# Patient Record
Sex: FEMALE | URBAN - METROPOLITAN AREA
[De-identification: names, ages, dates, MRNs, and addresses within clinical notes are randomized per-mention and may not be internally consistent; named-entity substitution may affect disease eponyms.]

---

## 2020-10-03 ENCOUNTER — INPATIENT (INPATIENT)
Facility: HOSPITAL | Age: 55
LOS: 13 days | Discharge: HOME | End: 2020-10-17
Attending: INTERNAL MEDICINE | Admitting: INTERNAL MEDICINE
Payer: MEDICAID

## 2020-10-03 VITALS
TEMPERATURE: 98 F | DIASTOLIC BLOOD PRESSURE: 87 MMHG | HEART RATE: 107 BPM | RESPIRATION RATE: 20 BRPM | OXYGEN SATURATION: 93 % | SYSTOLIC BLOOD PRESSURE: 131 MMHG

## 2020-10-03 DIAGNOSIS — Z02.9 ENCOUNTER FOR ADMINISTRATIVE EXAMINATIONS, UNSPECIFIED: ICD-10-CM

## 2020-10-03 DIAGNOSIS — U07.1 COVID-19: ICD-10-CM

## 2020-10-03 DIAGNOSIS — J96.01 ACUTE RESPIRATORY FAILURE WITH HYPOXIA: ICD-10-CM

## 2020-10-03 DIAGNOSIS — Z29.9 ENCOUNTER FOR PROPHYLACTIC MEASURES, UNSPECIFIED: ICD-10-CM

## 2020-10-03 LAB
ALBUMIN SERPL ELPH-MCNC: 4.1 G/DL — SIGNIFICANT CHANGE UP (ref 3.3–5)
ALP SERPL-CCNC: 97 U/L — SIGNIFICANT CHANGE UP (ref 40–120)
ALT FLD-CCNC: 31 U/L — SIGNIFICANT CHANGE UP (ref 4–33)
ANION GAP SERPL CALC-SCNC: 15 MMO/L — HIGH (ref 7–14)
APPEARANCE UR: CLEAR — SIGNIFICANT CHANGE UP
APTT BLD: 37.2 SEC — HIGH (ref 27–36.3)
AST SERPL-CCNC: 35 U/L — HIGH (ref 4–32)
B PERT DNA SPEC QL NAA+PROBE: SIGNIFICANT CHANGE UP
BACTERIA # UR AUTO: NEGATIVE — SIGNIFICANT CHANGE UP
BASE EXCESS BLDV CALC-SCNC: 1.5 MMOL/L — SIGNIFICANT CHANGE UP
BASOPHILS # BLD AUTO: 0.01 K/UL — SIGNIFICANT CHANGE UP (ref 0–0.2)
BASOPHILS NFR BLD AUTO: 0.1 % — SIGNIFICANT CHANGE UP (ref 0–2)
BILIRUB SERPL-MCNC: 0.4 MG/DL — SIGNIFICANT CHANGE UP (ref 0.2–1.2)
BILIRUB UR-MCNC: NEGATIVE — SIGNIFICANT CHANGE UP
BLOOD GAS VENOUS - CREATININE: 0.68 MG/DL — SIGNIFICANT CHANGE UP (ref 0.5–1.3)
BLOOD UR QL VISUAL: NEGATIVE — SIGNIFICANT CHANGE UP
BUN SERPL-MCNC: 8 MG/DL — SIGNIFICANT CHANGE UP (ref 7–23)
C PNEUM DNA SPEC QL NAA+PROBE: SIGNIFICANT CHANGE UP
CALCIUM SERPL-MCNC: 8.9 MG/DL — SIGNIFICANT CHANGE UP (ref 8.4–10.5)
CHLORIDE BLDV-SCNC: 102 MMOL/L — SIGNIFICANT CHANGE UP (ref 96–108)
CHLORIDE SERPL-SCNC: 98 MMOL/L — SIGNIFICANT CHANGE UP (ref 98–107)
CO2 SERPL-SCNC: 25 MMOL/L — SIGNIFICANT CHANGE UP (ref 22–31)
COLOR SPEC: YELLOW — SIGNIFICANT CHANGE UP
CREAT SERPL-MCNC: 0.58 MG/DL — SIGNIFICANT CHANGE UP (ref 0.5–1.3)
CRP SERPL-MCNC: 263.3 MG/L — HIGH
D DIMER BLD IA.RAPID-MCNC: < 150 NG/ML — SIGNIFICANT CHANGE UP
EOSINOPHIL # BLD AUTO: 0 K/UL — SIGNIFICANT CHANGE UP (ref 0–0.5)
EOSINOPHIL NFR BLD AUTO: 0 % — SIGNIFICANT CHANGE UP (ref 0–6)
FERRITIN SERPL-MCNC: 302.6 NG/ML — HIGH (ref 15–150)
FIBRINOGEN PPP-MCNC: 1128 MG/DL — HIGH (ref 290–520)
FLUAV H1 2009 PAND RNA SPEC QL NAA+PROBE: SIGNIFICANT CHANGE UP
FLUAV H1 RNA SPEC QL NAA+PROBE: SIGNIFICANT CHANGE UP
FLUAV H3 RNA SPEC QL NAA+PROBE: SIGNIFICANT CHANGE UP
FLUAV SUBTYP SPEC NAA+PROBE: SIGNIFICANT CHANGE UP
FLUBV RNA SPEC QL NAA+PROBE: SIGNIFICANT CHANGE UP
GAS PNL BLDV: 138 MMOL/L — SIGNIFICANT CHANGE UP (ref 136–146)
GLUCOSE BLDV-MCNC: 136 MG/DL — HIGH (ref 70–99)
GLUCOSE SERPL-MCNC: 140 MG/DL — HIGH (ref 70–99)
GLUCOSE UR-MCNC: NEGATIVE — SIGNIFICANT CHANGE UP
HADV DNA SPEC QL NAA+PROBE: SIGNIFICANT CHANGE UP
HCO3 BLDV-SCNC: 24 MMOL/L — SIGNIFICANT CHANGE UP (ref 20–27)
HCOV PNL SPEC NAA+PROBE: SIGNIFICANT CHANGE UP
HCT VFR BLD CALC: 40.1 % — SIGNIFICANT CHANGE UP (ref 34.5–45)
HCT VFR BLDV CALC: 42.4 % — SIGNIFICANT CHANGE UP (ref 34.5–45)
HGB BLD-MCNC: 13 G/DL — SIGNIFICANT CHANGE UP (ref 11.5–15.5)
HGB BLDV-MCNC: 13.8 G/DL — SIGNIFICANT CHANGE UP (ref 11.5–15.5)
HMPV RNA SPEC QL NAA+PROBE: SIGNIFICANT CHANGE UP
HPIV1 RNA SPEC QL NAA+PROBE: SIGNIFICANT CHANGE UP
HPIV2 RNA SPEC QL NAA+PROBE: SIGNIFICANT CHANGE UP
HPIV3 RNA SPEC QL NAA+PROBE: SIGNIFICANT CHANGE UP
HPIV4 RNA SPEC QL NAA+PROBE: SIGNIFICANT CHANGE UP
HYALINE CASTS # UR AUTO: SIGNIFICANT CHANGE UP
IMM GRANULOCYTES NFR BLD AUTO: 0.7 % — SIGNIFICANT CHANGE UP (ref 0–1.5)
INR BLD: 1.17 — HIGH (ref 0.88–1.16)
KETONES UR-MCNC: HIGH
LACTATE BLDV-MCNC: 1.7 MMOL/L — SIGNIFICANT CHANGE UP (ref 0.5–2)
LDH SERPL L TO P-CCNC: 247 U/L — HIGH (ref 135–225)
LEUKOCYTE ESTERASE UR-ACNC: NEGATIVE — SIGNIFICANT CHANGE UP
LYMPHOCYTES # BLD AUTO: 0.91 K/UL — LOW (ref 1–3.3)
LYMPHOCYTES # BLD AUTO: 6 % — LOW (ref 13–44)
MCHC RBC-ENTMCNC: 28.3 PG — SIGNIFICANT CHANGE UP (ref 27–34)
MCHC RBC-ENTMCNC: 32.4 % — SIGNIFICANT CHANGE UP (ref 32–36)
MCV RBC AUTO: 87.2 FL — SIGNIFICANT CHANGE UP (ref 80–100)
MONOCYTES # BLD AUTO: 0.62 K/UL — SIGNIFICANT CHANGE UP (ref 0–0.9)
MONOCYTES NFR BLD AUTO: 4.1 % — SIGNIFICANT CHANGE UP (ref 2–14)
NEUTROPHILS # BLD AUTO: 13.6 K/UL — HIGH (ref 1.8–7.4)
NEUTROPHILS NFR BLD AUTO: 89.1 % — HIGH (ref 43–77)
NITRITE UR-MCNC: NEGATIVE — SIGNIFICANT CHANGE UP
NRBC # FLD: 0 K/UL — SIGNIFICANT CHANGE UP (ref 0–0)
NT-PROBNP SERPL-SCNC: 42.96 PG/ML — SIGNIFICANT CHANGE UP
PCO2 BLDV: 46 MMHG — SIGNIFICANT CHANGE UP (ref 41–51)
PH BLDV: 7.38 PH — SIGNIFICANT CHANGE UP (ref 7.32–7.43)
PH UR: 6.5 — SIGNIFICANT CHANGE UP (ref 5–8)
PLATELET # BLD AUTO: 308 K/UL — SIGNIFICANT CHANGE UP (ref 150–400)
PMV BLD: 9 FL — SIGNIFICANT CHANGE UP (ref 7–13)
PO2 BLDV: 26 MMHG — LOW (ref 35–40)
POTASSIUM BLDV-SCNC: 3.5 MMOL/L — SIGNIFICANT CHANGE UP (ref 3.4–4.5)
POTASSIUM SERPL-MCNC: 3.6 MMOL/L — SIGNIFICANT CHANGE UP (ref 3.5–5.3)
POTASSIUM SERPL-SCNC: 3.6 MMOL/L — SIGNIFICANT CHANGE UP (ref 3.5–5.3)
PROCALCITONIN SERPL-MCNC: 0.75 NG/ML — HIGH (ref 0.02–0.1)
PROT SERPL-MCNC: 7.6 G/DL — SIGNIFICANT CHANGE UP (ref 6–8.3)
PROT UR-MCNC: 300 — HIGH
PROTHROM AB SERPL-ACNC: 13.4 SEC — SIGNIFICANT CHANGE UP (ref 10.6–13.6)
RAPID RVP RESULT: DETECTED
RBC # BLD: 4.6 M/UL — SIGNIFICANT CHANGE UP (ref 3.8–5.2)
RBC # FLD: 13.6 % — SIGNIFICANT CHANGE UP (ref 10.3–14.5)
RBC CASTS # UR COMP ASSIST: SIGNIFICANT CHANGE UP (ref 0–?)
RV+EV RNA SPEC QL NAA+PROBE: SIGNIFICANT CHANGE UP
SAO2 % BLDV: 42 % — LOW (ref 60–85)
SARS-COV-2 RNA SPEC QL NAA+PROBE: DETECTED
SODIUM SERPL-SCNC: 138 MMOL/L — SIGNIFICANT CHANGE UP (ref 135–145)
SP GR SPEC: 1.03 — SIGNIFICANT CHANGE UP (ref 1–1.04)
SQUAMOUS # UR AUTO: SIGNIFICANT CHANGE UP
TROPONIN T, HIGH SENSITIVITY: < 6 NG/L — SIGNIFICANT CHANGE UP (ref ?–14)
UROBILINOGEN FLD QL: NORMAL — SIGNIFICANT CHANGE UP
WBC # BLD: 15.25 K/UL — HIGH (ref 3.8–10.5)
WBC # FLD AUTO: 15.25 K/UL — HIGH (ref 3.8–10.5)
WBC UR QL: SIGNIFICANT CHANGE UP (ref 0–?)

## 2020-10-03 PROCEDURE — 93010 ELECTROCARDIOGRAM REPORT: CPT

## 2020-10-03 PROCEDURE — 99223 1ST HOSP IP/OBS HIGH 75: CPT | Mod: GC

## 2020-10-03 PROCEDURE — 71045 X-RAY EXAM CHEST 1 VIEW: CPT | Mod: 26

## 2020-10-03 PROCEDURE — 99291 CRITICAL CARE FIRST HOUR: CPT

## 2020-10-03 RX ORDER — REMDESIVIR 5 MG/ML
200 INJECTION INTRAVENOUS EVERY 24 HOURS
Refills: 0 | Status: COMPLETED | OUTPATIENT
Start: 2020-10-03 | End: 2020-10-03

## 2020-10-03 RX ORDER — SODIUM CHLORIDE 9 MG/ML
500 INJECTION INTRAMUSCULAR; INTRAVENOUS; SUBCUTANEOUS ONCE
Refills: 0 | Status: COMPLETED | OUTPATIENT
Start: 2020-10-03 | End: 2020-10-03

## 2020-10-03 RX ORDER — ACETAMINOPHEN 500 MG
650 TABLET ORAL ONCE
Refills: 0 | Status: COMPLETED | OUTPATIENT
Start: 2020-10-03 | End: 2020-10-03

## 2020-10-03 RX ORDER — DEXAMETHASONE 0.5 MG/5ML
8 ELIXIR ORAL ONCE
Refills: 0 | Status: COMPLETED | OUTPATIENT
Start: 2020-10-03 | End: 2020-10-03

## 2020-10-03 RX ORDER — DEXAMETHASONE 0.5 MG/5ML
8 ELIXIR ORAL ONCE
Refills: 0 | Status: DISCONTINUED | OUTPATIENT
Start: 2020-10-03 | End: 2020-10-03

## 2020-10-03 RX ORDER — REMDESIVIR 5 MG/ML
100 INJECTION INTRAVENOUS EVERY 24 HOURS
Refills: 0 | Status: COMPLETED | OUTPATIENT
Start: 2020-10-04 | End: 2020-10-07

## 2020-10-03 RX ORDER — REMDESIVIR 5 MG/ML
INJECTION INTRAVENOUS
Refills: 0 | Status: COMPLETED | OUTPATIENT
Start: 2020-10-03 | End: 2020-10-07

## 2020-10-03 RX ORDER — DEXAMETHASONE 0.5 MG/5ML
6 ELIXIR ORAL DAILY
Refills: 0 | Status: DISCONTINUED | OUTPATIENT
Start: 2020-10-04 | End: 2020-10-15

## 2020-10-03 RX ORDER — ACETAMINOPHEN 500 MG
1000 TABLET ORAL ONCE
Refills: 0 | Status: COMPLETED | OUTPATIENT
Start: 2020-10-03 | End: 2020-10-03

## 2020-10-03 RX ORDER — ENOXAPARIN SODIUM 100 MG/ML
40 INJECTION SUBCUTANEOUS DAILY
Refills: 0 | Status: DISCONTINUED | OUTPATIENT
Start: 2020-10-03 | End: 2020-10-17

## 2020-10-03 RX ORDER — ACETAMINOPHEN 500 MG
650 TABLET ORAL EVERY 6 HOURS
Refills: 0 | Status: DISCONTINUED | OUTPATIENT
Start: 2020-10-03 | End: 2020-10-11

## 2020-10-03 RX ADMIN — SODIUM CHLORIDE 500 MILLILITER(S): 9 INJECTION INTRAMUSCULAR; INTRAVENOUS; SUBCUTANEOUS at 15:37

## 2020-10-03 RX ADMIN — Medication 650 MILLIGRAM(S): at 15:37

## 2020-10-03 RX ADMIN — ENOXAPARIN SODIUM 40 MILLIGRAM(S): 100 INJECTION SUBCUTANEOUS at 22:44

## 2020-10-03 RX ADMIN — Medication 650 MILLIGRAM(S): at 16:37

## 2020-10-03 RX ADMIN — Medication 400 MILLIGRAM(S): at 22:45

## 2020-10-03 RX ADMIN — Medication 101.6 MILLIGRAM(S): at 15:37

## 2020-10-03 RX ADMIN — Medication 8 MILLIGRAM(S): at 16:37

## 2020-10-03 RX ADMIN — REMDESIVIR 500 MILLIGRAM(S): 5 INJECTION INTRAVENOUS at 23:30

## 2020-10-03 RX ADMIN — Medication 1000 MILLIGRAM(S): at 23:00

## 2020-10-03 NOTE — H&P ADULT - PROBLEM SELECTOR PLAN 4
1. Name of PCP:  2. PCP contacted on admission: [  ] Y   [  ] N  3. PCP contacted at Discharge: [  ] Y   [  ] N  4. Post-Discharge Appointment Date and Location:   5 Summary of Handoff given to PCP:

## 2020-10-03 NOTE — ED PROVIDER NOTE - CLINICAL SUMMARY MEDICAL DECISION MAKING FREE TEXT BOX
Kevin Wu MD. 55 F presenting for 5 days of cough sob pleurtiic cp diarrhea. lost sense of taste. no known sick contacts or possible covid19 exposure. tachypneic to 40s; on 6L NC with improvement of o2 sat and breathing; when off, pt becomes more sob and tachypneic; very very high suspicion for covid19. will tx as such. steroids, gentle ivf, plan to admit for hypoxia. currently, does nto require bipap

## 2020-10-03 NOTE — H&P ADULT - PROBLEM SELECTOR PLAN 1
- patient SARS-CoV-2 positive,   - On Bipap minimal setting 10/5 40%. MICU recommended considering wean off as possible. appreciate recs  - Remdesivir 200mg IVP X1, then 100mg qday x4 day  - decadron 6mg IVP qday likely 2/2 +Sars-CoV-2, currently more comfortable appearing on bipap  -ddimer negative, trop negative. Will continue remdesivir and decadron as below  -monitor cont. pulse ox. wean off bipap as tolerated.  -albuterol as needed likely 2/2 +Sars-CoV-2, currently more comfortable appearing on bipap  -ddimer negative, trop negative, although ekg with twi in inferior leads with no prior to compare to, possibly demand related? Will continue remdesivir and decadron as below  -monitor cont. pulse ox. wean off bipap as tolerated.  -albuterol as needed

## 2020-10-03 NOTE — ED PROVIDER NOTE - NS ED ROS FT
Constitutional: fevers; no chills  HEENT: no visual changes, no sore throat, no rhinorrhea  CV: no cp; no palpitations  Resp: sob; cough  GI: no abd pain, no nausea, no vomiting, diarrhea, no constipation  : no dysuria, no hematuria  MSK: no myalgais; no arthralgias  skin: no rashes  neuro: no HA, no numbness; no weakness, no tingling  ROS statement: all other ROS negative except as per HPI

## 2020-10-03 NOTE — H&P ADULT - PROBLEM SELECTOR PLAN 3
1. Name of PCP:  2. PCP contacted on admission: [  ] Y   [  ] N  3. PCP contacted at Discharge: [  ] Y   [  ] N  4. Post-Discharge Appointment Date and Location:   5 Summary of Handoff given to PCP: - DVT: lovenox

## 2020-10-03 NOTE — ED ADULT NURSE REASSESSMENT NOTE - NS ED NURSE REASSESS COMMENT FT1
pt is alert sitting in stretcher on BIPAP, using phone, verbalizes she is feeling better after being put onto BIPAP. remains tachypneic to the 30s. will continue to monitor.

## 2020-10-03 NOTE — H&P ADULT - NSHPPHYSICALEXAM_GEN_ALL_CORE
VITALS:   Vital Signs Last 24 Hrs  T(C): 36.8 (03 Oct 2020 20:55), Max: 39.3 (03 Oct 2020 15:28)  T(F): 98.2 (03 Oct 2020 20:55), Max: 102.7 (03 Oct 2020 15:28)  HR: 85 (03 Oct 2020 20:55) (85 - 117)  BP: 144/78 (03 Oct 2020 20:55) (116/54 - 166/66)  BP(mean): 86 (03 Oct 2020 18:22) (74 - 86)  RR: 36 (03 Oct 2020 20:55) (20 - 36)  SpO2: 97% (03 Oct 2020 20:55) (93% - 97%)    GENERAL: NAD, lying in bed comfortably  HEAD:  Atraumatic, Normocephalic  EYES: EOMI, PERRLA, conjunctiva and sclera clear  ENT: Moist mucous membranes  NECK: Supple, No JVD  CHEST/LUNG: Clear to auscultation bilaterally; No rales, rhonchi, wheezing, or rubs. Unlabored respirations  HEART: Regular rate and rhythm; No murmurs, rubs, or gallops  ABDOMEN: Bowel sounds present; Soft, Nontender, Nondistended. No hepatomegally  EXTREMITIES:  2+ Peripheral Pulses, brisk capillary refill. No clubbing, cyanosis, or edema  NERVOUS SYSTEM:  Alert & Oriented X3, speech clear. No deficits   MSK: FROM all 4 extremities, full and equal strength  SKIN: No rashes or lesions VITALS:   Vital Signs Last 24 Hrs  T(C): 36.8 (03 Oct 2020 20:55), Max: 39.3 (03 Oct 2020 15:28)  T(F): 98.2 (03 Oct 2020 20:55), Max: 102.7 (03 Oct 2020 15:28)  HR: 85 (03 Oct 2020 20:55) (85 - 117)  BP: 144/78 (03 Oct 2020 20:55) (116/54 - 166/66)  BP(mean): 86 (03 Oct 2020 18:22) (74 - 86)  RR: 36 (03 Oct 2020 20:55) (20 - 36)  SpO2: 97% (03 Oct 2020 20:55) (93% - 97%)    GENERAL: NAD, lying in bed comfortably, +bipap  HEAD:  Atraumatic, Normocephalic  EYES: EOMI, PERRLA, conjunctiva and sclera clear  ENT: Moist mucous membranes  NECK: Supple, No JVD  CHEST/LUNG: Clear to auscultation bilaterally; No rales, rhonchi, wheezing, or rubs. Unlabored respirations  HEART: Regular rate and rhythm; No murmurs, rubs, or gallops  ABDOMEN: Bowel sounds present; Soft, Nontender, Nondistended. No hepatomegally  EXTREMITIES:  2+ Peripheral Pulses, brisk capillary refill. No clubbing, cyanosis, or edema  NERVOUS SYSTEM:  Alert & Oriented X3, speech clear. No deficits   MSK: FROM all 4 extremities, full and equal strength  SKIN: No rashes or lesions

## 2020-10-03 NOTE — CONSULT NOTE ADULT - ASSESSMENT
56 yo F with no significant PMH presented with SOB x 5 days, a/w loss of sense of smell and taste, nausea, and diarrhea, found to be hypoxic, initially placed on NC, switched to BIPAP for increased WOB. MICU consulted for new BIPAP.     #Acute hypoxic respiratory failure likely 2/2 COVID PNA  - hypoxic to 80s on RA -> 93% on 6LC -> 95% on BIPAP   - pt appears tachypneic but comfortable on BIPAP, is on minimal settings (10/5 40%)  - can c/w BIPAP for now, consider HFNC or weaning to NC   - f/u COVID PCR; given her symptoms and lab findings (elevated inflammatory markers), high suspicion of COVID     Patient is not a MICU candidate at this time. Please reconsult as needed.    - Lilia Mendiola PGY3

## 2020-10-03 NOTE — ED ADULT NURSE REASSESSMENT NOTE - NS ED NURSE REASSESS COMMENT FT1
As per respiratory, remove pt from bipap and place on NRB for transport to floor. Pt oxygen saturation 97% on NRB at this time, transport here to take pt to floor, respiratory here to transport bipap to floor.

## 2020-10-03 NOTE — CONSULT NOTE ADULT - SUBJECTIVE AND OBJECTIVE BOX
MICU Accept Note    CHIEF COMPLAINT: Patient is a 55y old  Female who presents with a chief complaint of SOB.    HPI / INTERVAL HISTORY:  54 yo F with no significant PMH presented with SOB x 5 days, a/w loss of sense of smell and taste, nausea, and diarrhea. Patient has had diarrhea for about 2 days with 3 episodes of watery diarrhea today. Patient endorses nonproductive cough as well. She states she has been outside. Denies sick contacts but her  recently came down with a fever yesterday. Patient states she had the COVID antibody test done recently and was negative. Per ED documentation, pt went to PCP yesterday and was prescribed a Z-pack. Per EMS she was hypoxic to 80s on RA, improved to 93% on 6L NC.     On arrival to ED, patient was still satting >90s on NC but noted to be tachypneic, with increased WOB. She was placed on BIPAP. MICU consulted for new BIPAP.       PAST MEDICAL & SURGICAL HISTORY:  No pertinent past medical history    No significant past surgical history        FAMILY HISTORY:  No pertinent family history in first degree relatives        SOCIAL HISTORY:  Denies smoking, drinking, no recreational drugs  Lives with       HOME MEDICATIONS:  No meds    Allergies    No Known Drug Allergies  Nuts (Hives)    Intolerances          REVIEW OF SYSTEMS:  Constitutional: +Fevers, fatigue; No chills, weight loss, weight gain  HEENT: No vision problems, eye pain, nasal congestion, rhinorrhea, sore throat, dysphagia  CV: +pleuritic chest pain; No orthopnea, palpitations  Resp: +nonproductive cough, dyspnea; no wheezing, hemoptysis  GI: +nausea, diarrhea; No vomiting, constipation, abdominal pain  : [ ] dysuria [ ] nocturia [ ] hematuria [ ] increased urinary frequency  Musculoskeletal: [ ] back pain [ ] myalgias [ ] arthralgias [ ] fracture  Skin: [ ] rash [ ] itch  Neurological: [ ] headache [ ] dizziness [ ] syncope [ ] weakness [ ] numbness  Psychiatric: [ ] anxiety [ ] depression  Endocrine: [ ] diabetes [ ] thyroid problem  Hematologic/Lymphatic: [ ] anemia [ ] bleeding problem  Allergic/Immunologic: [ ] itchy eyes [ ] nasal discharge [ ] hives [ ] angioedema  [ x] All other systems negative  [ ] Unable to assess ROS because ________    OBJECTIVE:  ICU Vital Signs Last 24 Hrs  T(C): 38.7 (03 Oct 2020 16:35), Max: 39.3 (03 Oct 2020 15:28)  T(F): 101.7 (03 Oct 2020 16:35), Max: 102.7 (03 Oct 2020 15:28)  HR: 94 (03 Oct 2020 18:22) (94 - 117)  BP: 121/72 (03 Oct 2020 18:22) (116/54 - 166/66)  BP(mean): 86 (03 Oct 2020 18:22) (74 - 86)  ABP: --  ABP(mean): --  RR: 32 (03 Oct 2020 18:22) (20 - 35)  SpO2: 97% (03 Oct 2020 18:22) (93% - 97%)        CAPILLARY BLOOD GLUCOSE          PHYSICAL EXAM:  GENERAL: No acute distress, on BIPAP  HEAD:  Atraumatic, Normocephalic  EYES: EOMI, conjunctiva and sclera clear  NECK: Supple, no lymphadenopathy  CHEST/LUNG: Tachypneic but not dyspneic, CTAB; No wheezes, rales, or rhonchi  HEART: Regular rate and rhythm; No murmurs, rubs, or gallops  ABDOMEN: Soft, non-tender, non-distended; normal bowel sounds, no organomegaly  EXTREMITIES:  2+ peripheral pulses b/l, No clubbing, cyanosis, or edema  NEUROLOGY: A&O x 3, no focal deficits  SKIN: No rashes or lesions    LINES:     HOSPITAL MEDICATIONS:  MEDICATIONS  (STANDING):    MEDICATIONS  (PRN):      LABS:                        13.0   15.25 )-----------( 308      ( 03 Oct 2020 15:30 )             40.1     Hgb Trend: 13.0<--  10-03    138  |  98  |  8   ----------------------------<  140<H>  3.6   |  25  |  0.58    Ca    8.9      03 Oct 2020 15:30    TPro  7.6  /  Alb  4.1  /  TBili  0.4  /  DBili  x   /  AST  35<H>  /  ALT  31  /  AlkPhos  97  10-03    Creatinine Trend: 0.58<--  PT/INR - ( 03 Oct 2020 15:30 )   PT: 13.4 SEC;   INR: 1.17          PTT - ( 03 Oct 2020 15:30 )  PTT:37.2 SEC      Venous Blood Gas:  10-03 @ 15:30  7.38/46/26/24/42.0  VBG Lactate: 1.7      MICROBIOLOGY:     RADIOLOGY & ADDITIONAL TESTS:  < from: Xray Chest 1 View- PORTABLE-Urgent (10.03.20 @ 16:05) >  INTERPRETATION:  Low lung volumes. bibasilar linear atelectasis.

## 2020-10-03 NOTE — ED ADULT NURSE REASSESSMENT NOTE - NS ED NURSE REASSESS COMMENT FT1
Break Coverage - Vitals as noted, on 6 L NC, tachycardic and tachypneic, MD Proctor at bedside for evaluation.  Patient on iso for r/o covid as per facility protocol.  IV placed to R AC #18g, labs collected/sent, medicated per e-mar. Advised to plan of care, comfort & safety measures in place, call bell in reach.

## 2020-10-03 NOTE — H&P ADULT - NSHPLABSRESULTS_GEN_ALL_CORE
13.0   15.25 )-----------( 308      ( 03 Oct 2020 15:30 )             40.1       10-03    138  |  98  |  8   ----------------------------<  140<H>  3.6   |  25  |  0.58    Ca    8.9      03 Oct 2020 15:30    TPro  7.6  /  Alb  4.1  /  TBili  0.4  /  DBili  x   /  AST  35<H>  /  ALT  31  /  AlkPhos  97  10-03          SARS-CoV-2: Detected: This Respiratory Panel uses polymerase chain reaction (PCR) to detect for   adenovirus; coronavirus (HKU1, NL63, 229E, OC43); human metapneumovirus   (hMPV); human enterovirus/rhinovirus (Entero/RV); influenza A; influenza   A/H1; influenza A/H3; influenza A/H1-2009; influenza B; parainfluenza   viruses 1, 2, 3, 4; respiratory syncytial virus; Mycoplasma pneumoniae;   Chlamydophila pneumoniae; and SARS-CoV-2. (10.03.20 @ 16:00) 13.0   15.25 )-----------( 308      ( 03 Oct 2020 15:30 )             40.1       10-03    138  |  98  |  8   ----------------------------<  140<H>  3.6   |  25  |  0.58    Ca    8.9      03 Oct 2020 15:30    TPro  7.6  /  Alb  4.1  /  TBili  0.4  /  DBili  x   /  AST  35<H>  /  ALT  31  /  AlkPhos  97  10-03          SARS-CoV-2: Detected: This Respiratory Panel uses polymerase chain reaction (PCR) to detect for   adenovirus; coronavirus (HKU1, NL63, 229E, OC43); human metapneumovirus   (hMPV); human enterovirus/rhinovirus (Entero/RV); influenza A; influenza   A/H1; influenza A/H3; influenza A/H1-2009; influenza B; parainfluenza   viruses 1, 2, 3, 4; respiratory syncytial virus; Mycoplasma pneumoniae;   Chlamydophila pneumoniae; and SARS-CoV-2. (10.03.20 @ 16:00)    EKG: sinus tachycardia with nonspecific twi 3, avf, V3-V4

## 2020-10-03 NOTE — H&P ADULT - NSHPREVIEWOFSYSTEMS_GEN_ALL_CORE
REVIEW OF SYSTEMS:    CONSTITUTIONAL: +fever fatigue  EYES/ENT: No visual changes;  No vertigo or throat pain   NECK: No pain or stiffness  RESPIRATORY: +cough; +shortness of breath  CARDIOVASCULAR: +Pleuritic chest pain, no palpitation  GASTROINTESTINAL: No abdominal or epigastric pain. +nausea, diarrhea.  GENITOURINARY: No dysuria, frequency or hematuria  NEUROLOGICAL: No numbness or weakness  SKIN: No itching, rashes

## 2020-10-03 NOTE — ED PROVIDER NOTE - PHYSICAL EXAMINATION
PHYSICAL EXAM:  GENERAL: tachypneic, breathing at rate of 30s-40s; unable to speak in full sentences; on 6L NC; when turning off O2, pt becomes more tachypneic;   HEAD Atraumatic, Normocephalic  NECK: No JVD; FROM  EYES: PERRL, EOMs intact b/l w/out deficits  CHEST/LUNG: CTAB no wheezes/rhonchi/rales  HEART: tachycardic; no murmur/gallops/rubs  ABDOMEN: +BS, soft, NT, ND  EXTREMITIES: No LE edema, +2 radial pulses b/l, +2 DP/PT pulses b/l  MUSCULOSKELETAL: FROM of all 4 extremities;  NERVOUS SYSTEM:  A&Ox3, No motor deficits or sensory deficits; CNII-XII intact; no focal neurologic deficits  SKIN:  No new rashes

## 2020-10-03 NOTE — CONSULT NOTE ADULT - ATTENDING COMMENTS
hypoxic resp failure and fever requiring bipap, high suspicion of COVID  pt comfortable on bipap 10/5 40% saturating 97%  pancx, Abx  r/o cobid/rvp  reconsult as needed

## 2020-10-03 NOTE — ED ADULT NURSE NOTE - NSIMPLEMENTINTERV_GEN_ALL_ED
Implemented All Universal Safety Interventions:  [place] to call system. Call bell, personal items and telephone within reach. Instruct patient to call for assistance. Room bathroom lighting operational. Non-slip footwear when patient is off stretcher. Physically safe environment: no spills, clutter or unnecessary equipment. Stretcher in lowest position, wheels locked, appropriate side rails in place.

## 2020-10-03 NOTE — ED ADULT NURSE NOTE - OBJECTIVE STATEMENT
Received pt to bed 5, A+Ox3, ambulatory. C/O SOB, MCKAY, Fever, chills, loss of taste/smell. Respirations even and labored, tachypneic to 35, no accessory muscle use, unable to speak in full clear uninterrupted sentences. ABD is soft, non tender, non distended with normal active bowel sounds, pt endorses 3 episodes of diarrhea. Pt denies any chest pain, headache, dizziness. will continue to monitor.

## 2020-10-03 NOTE — ED PROVIDER NOTE - OBJECTIVE STATEMENT
56 yo F with no significant PMHx presents to the ED for 5 days of cough, sob, pleurtiic cp, bodyaches, fevers. also states she has lost her sense of smell. also had 3 episodes of watery diarrhea today. went to pcp yeserday and was rx'd zpak but today, became so sob and thus presents to ED. per ems, pt hypoxic on RA to 80%; here improved to 93 on 6L NC. denies any sick contacts. pt is not working and states she has not been outside the house. if she does leave, she states she always wears her mask. of note, she states her  started feeling sick yesterday. 56 yo F with no significant PMHx presents to the ED for 5 days of cough, sob, pleurtiic cp, bodyaches, fevers. also states she has lost her sense of smell. also had 3 episodes of watery diarrhea today. went to pcp yeserday and was rx'd zpak but today, became so sob and thus presents to ED. per ems, pt hypoxic on RA to 80%; here improved to 93 on 6L NC. denies any sick contacts. pt is not working and states she has not been outside the house. if she does leave, she states she always wears her mask. of note, she states her  started feeling sick yesterday.  PMD DR. Schmidt

## 2020-10-03 NOTE — ED PROVIDER NOTE - PROGRESS NOTE DETAILS
Dr. Proctor: Pt remains tachypneic and will desat when O2 lowered. Pt placed on Bipap with improvement of symptoms. Kevin Wu MD. pt reassessed on bipap, doing well, subjectively feeling better, HR decreased, RR decreased. MICU evaluated; not a candidate at this time. accepted to hospitalist. nia: pt signed out by dr lozano. pt on bipap requiring MICU eval. pt hd stable, comfortable with no signs of obv resp distress. nia: pt rejected by micu as hd stable with no resp distress. pt stable for floor admission at this time.

## 2020-10-03 NOTE — H&P ADULT - PROBLEM SELECTOR PLAN 2
- DVT: lovenox - patient SARS-CoV-2 positive,   - On Bipap minimal setting 10/5 40%. MICU recommended considering wean off as possible. appreciate recs  - Remdesivir 200mg IVP X1, then 100mg qday x4 day  - decadron 6mg IVP qday

## 2020-10-03 NOTE — ED PROVIDER NOTE - CONSTITUTIONAL, MLM
normal... Ill appearing, awake, alert, oriented to person, place, time/situation and in moderate distress.

## 2020-10-03 NOTE — ED ADULT NURSE REASSESSMENT NOTE - NS ED NURSE REASSESS COMMENT FT1
Break Coverage - patient reports mild improvement to SOB, chest discomfort.  Appearing tachypneic, o2sat 96% on 5L NC, speech clear, able to talk in full sentences but with obvious increased WOB.  Patient advised possibility for bipap, explained, accepting of plan of care. Will CTM for improvement, call bell within reach.

## 2020-10-03 NOTE — ED ADULT TRIAGE NOTE - CHIEF COMPLAINT QUOTE
Pt c/o sob, cough fever and headache x 5 days.  O2 80 %RA, on 6 L at triage .  Pt started Zpac prescribed by PCP.  Pt took tylenol 2 tabs this am.  Denies chest pain, N/V, diarrhea

## 2020-10-03 NOTE — H&P ADULT - ASSESSMENT
Pt. was seen earlier, images reviewed with Dr. Bryson.  As per Dr. Bryson, do not agree with radiology, images of what to be stone does not relate to ureter.   F/U with Dr. Bryson as an outpatient. 54 yo F without PMH presented with COVID-19 pneumonia 54 yo F without PMH presents to ED with 5 days of fever, cough, shortness of breath found hypoxic in ED requiring bipap and with +COVID-19 to be admitted for further management.

## 2020-10-03 NOTE — H&P ADULT - NSHPSOCIALHISTORY_GEN_ALL_CORE
Patient live with her , who began to have cough yesterday, otherwise no sick contact. Patient live with her , who began to have cough yesterday, otherwise no known sick contact. Patient has been outside. Patient denies any smoking, alcohol use or recreational drug use.

## 2020-10-03 NOTE — ED PROVIDER NOTE - ATTENDING CONTRIBUTION TO CARE
Pt was seen and evaluated by me. Pt is a 56 y/o female with no significant PMHx who presented to the ED for cough, SOB, CP, body aches, and fevers X 5 days. Pt states over the past 5 days having cough, SOB, CP, body aches, and fevers. Pt admits to + sick contacts with  who has similar URI symptoms. Pt was started on ZIthromax with no relief. Pt called EMS today for worsening SOB and found to be 80% on RA. Pt was placed on NC at 6L and sating 95%. Pt admits to 2 episodes of diarrhea today yuliya with loss of smell. Denies any nausea, vomiting, or abd pain. Pt tachypneic. Lungs CTA b/l. Tachy. Abd soft, non-tender. No calf tenderness or swelling.  Concern for COVID/URI/Respiratory Distress  Labs, EKG, CXR, COVID, IVF, Steroids

## 2020-10-03 NOTE — H&P ADULT - HISTORY OF PRESENT ILLNESS
54 yo F with no significant PMH presented with SOB x 5 days, a/w loss of sense of smell and taste, nausea, and diarrhea. Patient has had diarrhea for about 2 days with 3 episodes of watery diarrhea today. Patient endorses nonproductive cough as well. She states she has been outside. Denies sick contacts but her  recently came down with a fever yesterday. Patient states she had the COVID antibody test done recently and was negative. Per ED documentation, pt went to PCP yesterday and was prescribed a Z-pack. Per EMS she was hypoxic to 80s on RA, improved to 93% on 6L NC.     On arrival to ED, patient was still satting >90s on NC but noted to be tachypneic, with increased WOB. She was placed on BIPAP.

## 2020-10-04 DIAGNOSIS — D72.829 ELEVATED WHITE BLOOD CELL COUNT, UNSPECIFIED: ICD-10-CM

## 2020-10-04 DIAGNOSIS — J96.01 ACUTE RESPIRATORY FAILURE WITH HYPOXIA: ICD-10-CM

## 2020-10-04 LAB
ALBUMIN SERPL ELPH-MCNC: 3.7 G/DL — SIGNIFICANT CHANGE UP (ref 3.3–5)
ALP SERPL-CCNC: 93 U/L — SIGNIFICANT CHANGE UP (ref 40–120)
ALT FLD-CCNC: 23 U/L — SIGNIFICANT CHANGE UP (ref 4–33)
ANION GAP SERPL CALC-SCNC: 12 MMO/L — SIGNIFICANT CHANGE UP (ref 7–14)
APTT BLD: 37.3 SEC — HIGH (ref 27–36.3)
AST SERPL-CCNC: 27 U/L — SIGNIFICANT CHANGE UP (ref 4–32)
BASOPHILS # BLD AUTO: 0.01 K/UL — SIGNIFICANT CHANGE UP (ref 0–0.2)
BASOPHILS NFR BLD AUTO: 0.1 % — SIGNIFICANT CHANGE UP (ref 0–2)
BILIRUB SERPL-MCNC: 0.3 MG/DL — SIGNIFICANT CHANGE UP (ref 0.2–1.2)
BUN SERPL-MCNC: 10 MG/DL — SIGNIFICANT CHANGE UP (ref 7–23)
CALCIUM SERPL-MCNC: 9 MG/DL — SIGNIFICANT CHANGE UP (ref 8.4–10.5)
CHLORIDE SERPL-SCNC: 104 MMOL/L — SIGNIFICANT CHANGE UP (ref 98–107)
CO2 SERPL-SCNC: 23 MMOL/L — SIGNIFICANT CHANGE UP (ref 22–31)
CREAT SERPL-MCNC: 0.41 MG/DL — LOW (ref 0.5–1.3)
CRP SERPL-MCNC: 320.5 MG/L — HIGH
EOSINOPHIL # BLD AUTO: 0 K/UL — SIGNIFICANT CHANGE UP (ref 0–0.5)
EOSINOPHIL NFR BLD AUTO: 0 % — SIGNIFICANT CHANGE UP (ref 0–6)
FERRITIN SERPL-MCNC: 328.3 NG/ML — HIGH (ref 15–150)
GLUCOSE SERPL-MCNC: 145 MG/DL — HIGH (ref 70–99)
HCT VFR BLD CALC: 38.1 % — SIGNIFICANT CHANGE UP (ref 34.5–45)
HCV AB S/CO SERPL IA: 0.07 S/CO — SIGNIFICANT CHANGE UP (ref 0–0.99)
HCV AB SERPL-IMP: SIGNIFICANT CHANGE UP
HGB BLD-MCNC: 12.5 G/DL — SIGNIFICANT CHANGE UP (ref 11.5–15.5)
IMM GRANULOCYTES NFR BLD AUTO: 1 % — SIGNIFICANT CHANGE UP (ref 0–1.5)
INR BLD: 1.13 — SIGNIFICANT CHANGE UP (ref 0.88–1.16)
LYMPHOCYTES # BLD AUTO: 0.92 K/UL — LOW (ref 1–3.3)
LYMPHOCYTES # BLD AUTO: 5.1 % — LOW (ref 13–44)
MAGNESIUM SERPL-MCNC: 2.3 MG/DL — SIGNIFICANT CHANGE UP (ref 1.6–2.6)
MCHC RBC-ENTMCNC: 28.5 PG — SIGNIFICANT CHANGE UP (ref 27–34)
MCHC RBC-ENTMCNC: 32.8 % — SIGNIFICANT CHANGE UP (ref 32–36)
MCV RBC AUTO: 86.8 FL — SIGNIFICANT CHANGE UP (ref 80–100)
MONOCYTES # BLD AUTO: 0.47 K/UL — SIGNIFICANT CHANGE UP (ref 0–0.9)
MONOCYTES NFR BLD AUTO: 2.6 % — SIGNIFICANT CHANGE UP (ref 2–14)
NEUTROPHILS # BLD AUTO: 16.56 K/UL — HIGH (ref 1.8–7.4)
NEUTROPHILS NFR BLD AUTO: 91.2 % — HIGH (ref 43–77)
NRBC # FLD: 0 K/UL — SIGNIFICANT CHANGE UP (ref 0–0)
PHOSPHATE SERPL-MCNC: 2 MG/DL — LOW (ref 2.5–4.5)
PLATELET # BLD AUTO: 316 K/UL — SIGNIFICANT CHANGE UP (ref 150–400)
PMV BLD: 8.8 FL — SIGNIFICANT CHANGE UP (ref 7–13)
POTASSIUM SERPL-MCNC: 3.8 MMOL/L — SIGNIFICANT CHANGE UP (ref 3.5–5.3)
POTASSIUM SERPL-SCNC: 3.8 MMOL/L — SIGNIFICANT CHANGE UP (ref 3.5–5.3)
PROCALCITONIN SERPL-MCNC: 0.68 NG/ML — HIGH (ref 0.02–0.1)
PROT SERPL-MCNC: 7.3 G/DL — SIGNIFICANT CHANGE UP (ref 6–8.3)
PROTHROM AB SERPL-ACNC: 12.9 SEC — SIGNIFICANT CHANGE UP (ref 10.6–13.6)
RBC # BLD: 4.39 M/UL — SIGNIFICANT CHANGE UP (ref 3.8–5.2)
RBC # FLD: 13.7 % — SIGNIFICANT CHANGE UP (ref 10.3–14.5)
SODIUM SERPL-SCNC: 139 MMOL/L — SIGNIFICANT CHANGE UP (ref 135–145)
WBC # BLD: 18.15 K/UL — HIGH (ref 3.8–10.5)
WBC # FLD AUTO: 18.15 K/UL — HIGH (ref 3.8–10.5)

## 2020-10-04 PROCEDURE — 99233 SBSQ HOSP IP/OBS HIGH 50: CPT

## 2020-10-04 RX ORDER — ALBUTEROL 90 UG/1
2 AEROSOL, METERED ORAL EVERY 4 HOURS
Refills: 0 | Status: DISCONTINUED | OUTPATIENT
Start: 2020-10-04 | End: 2020-10-04

## 2020-10-04 RX ORDER — SODIUM,POTASSIUM PHOSPHATES 278-250MG
1 POWDER IN PACKET (EA) ORAL
Refills: 0 | Status: COMPLETED | OUTPATIENT
Start: 2020-10-04 | End: 2020-10-04

## 2020-10-04 RX ORDER — ALBUTEROL 90 UG/1
1 AEROSOL, METERED ORAL EVERY 4 HOURS
Refills: 0 | Status: DISCONTINUED | OUTPATIENT
Start: 2020-10-04 | End: 2020-10-17

## 2020-10-04 RX ADMIN — ALBUTEROL 1 PUFF(S): 90 AEROSOL, METERED ORAL at 10:00

## 2020-10-04 RX ADMIN — ALBUTEROL 1 PUFF(S): 90 AEROSOL, METERED ORAL at 12:20

## 2020-10-04 RX ADMIN — REMDESIVIR 500 MILLIGRAM(S): 5 INJECTION INTRAVENOUS at 22:44

## 2020-10-04 RX ADMIN — ENOXAPARIN SODIUM 40 MILLIGRAM(S): 100 INJECTION SUBCUTANEOUS at 12:20

## 2020-10-04 RX ADMIN — Medication 1 PACKET(S): at 12:20

## 2020-10-04 RX ADMIN — Medication 1 PACKET(S): at 09:45

## 2020-10-04 RX ADMIN — Medication 1 PACKET(S): at 22:11

## 2020-10-04 RX ADMIN — ALBUTEROL 1 PUFF(S): 90 AEROSOL, METERED ORAL at 22:11

## 2020-10-04 RX ADMIN — ALBUTEROL 1 PUFF(S): 90 AEROSOL, METERED ORAL at 18:38

## 2020-10-04 RX ADMIN — Medication 1 PACKET(S): at 18:38

## 2020-10-04 NOTE — PROGRESS NOTE ADULT - PROBLEM SELECTOR PLAN 1
Likely 2/2 COVID, currently on BiPAP  -ddimer WNL, trop negative, although ekg with twi in inferior leads with no prior to compare to, possibly demand related?   -continue remdesivir and decadron  -monitor cont. pulse ox. wean off bipap as tolerated.  -albuterol as needed

## 2020-10-04 NOTE — PROGRESS NOTE ADULT - SUBJECTIVE AND OBJECTIVE BOX
Akosua Last  [facility]  Pager #[phone]    Patient is a 55y old  Female who presents with a chief complaint of SOB (04 Oct 2020 14:14)      SUBJECTIVE / OVERNIGHT EVENTS: Patient seen and examined at bedside. Currently on BiPAP with O2 saturation 96%, appears comfortable. Denies abdominal pain.    ADDITIONAL REVIEW OF SYSTEMS:    MEDICATIONS  (STANDING):  ALBUTerol    90 MICROgram(s) HFA Inhaler 1 Puff(s) Inhalation every 4 hours  dexAMETHasone  Injectable 6 milliGRAM(s) IV Push daily  enoxaparin Injectable 40 milliGRAM(s) SubCutaneous daily  potassium phosphate / sodium phosphate Powder (PHOS-NaK) 1 Packet(s) Oral four times a day with meals  remdesivir  IVPB 100 milliGRAM(s) IV Intermittent every 24 hours  remdesivir  IVPB   IV Intermittent     MEDICATIONS  (PRN):  acetaminophen   Tablet .. 650 milliGRAM(s) Oral every 6 hours PRN Temp greater or equal to 38C (100.4F)      CAPILLARY BLOOD GLUCOSE        I&O's Summary    03 Oct 2020 07:01  -  04 Oct 2020 07:00  --------------------------------------------------------  IN: 0 mL / OUT: 0 mL / NET: 0 mL    04 Oct 2020 07:01  -  04 Oct 2020 15:13  --------------------------------------------------------  IN: 450 mL / OUT: 0 mL / NET: 450 mL        PHYSICAL EXAM:    Vital Signs Last 24 Hrs  T(C): 36.7 (04 Oct 2020 14:00), Max: 39.3 (03 Oct 2020 15:28)  T(F): 98 (04 Oct 2020 14:00), Max: 102.7 (03 Oct 2020 15:28)  HR: 95 (04 Oct 2020 14:00) (67 - 109)  BP: 119/71 (04 Oct 2020 14:00) (112/72 - 166/66)  BP(mean): 86 (03 Oct 2020 18:22) (74 - 86)  RR: 20 (04 Oct 2020 14:00) (16 - 36)  SpO2: 98% (04 Oct 2020 14:00) (92% - 98%)    CONSTITUTIONAL: NAD, well-developed  EYES: Conjunctiva and sclera clear  RESPIRATORY: Crackles b/l  CARDIOVASCULAR: Regular rate and rhythm, normal S1 and S2, no murmur/rub/gallop; No lower extremity edema  ABDOMEN: Nontender to palpation, normoactive bowel sounds, no rebound/guarding  MUSCULOSKELETAL: No clubbing or cyanosis of digits  PSYCH: A+O to person, place, and time; affect appropriate  NEUROLOGY: CN 2-12 are intact and symmetric; no gross sensory deficits   SKIN: No rashes; no palpable lesions    LABS:                        12.5   18.15 )-----------( 316      ( 04 Oct 2020 06:50 )             38.1     10-04    139  |  104  |  10  ----------------------------<  145<H>  3.8   |  23  |  0.41<L>    Ca    9.0      04 Oct 2020 06:50  Phos  2.0     10-04  Mg     2.3     10-04    TPro  7.3  /  Alb  3.7  /  TBili  0.3  /  DBili  x   /  AST  27  /  ALT  23  /  AlkPhos  93  10-04    PT/INR - ( 04 Oct 2020 06:50 )   PT: 12.9 SEC;   INR: 1.13          PTT - ( 04 Oct 2020 06:50 )  PTT:37.3 SEC      Urinalysis Basic - ( 03 Oct 2020 21:56 )    Color: YELLOW / Appearance: CLEAR / S.033 / pH: 6.5  Gluc: NEGATIVE / Ketone: MODERATE  / Bili: NEGATIVE / Urobili: NORMAL   Blood: NEGATIVE / Protein: 300 / Nitrite: NEGATIVE   Leuk Esterase: NEGATIVE / RBC: 3-5 / WBC 3-5   Sq Epi: FEW / Non Sq Epi: x / Bacteria: NEGATIVE      RADIOLOGY & ADDITIONAL TESTS: No new imaging  Results Reviewed: Yes  Imaging Personally Reviewed:  Electrocardiogram Personally Reviewed:    COORDINATION OF CARE:  Care Discussed with Consultants/Other Providers [Y/N]:  Prior or Outpatient Records Reviewed [Y/N]:

## 2020-10-04 NOTE — PROGRESS NOTE ADULT - SUBJECTIVE AND OBJECTIVE BOX
Dr. Litzy Nelson  Pager [phone]    PROGRESS NOTE:     Patient is a 55y old  Female who presents with a chief complaint of SOB (03 Oct 2020 22:16)      SUBJECTIVE / OVERNIGHT EVENTS: pt is on cpap 10//fio2 45%, satting okay  ADDITIONAL REVIEW OF SYSTEMS: breathing a little better, has diarrhea     MEDICATIONS  (STANDING):  ALBUTerol    90 MICROgram(s) HFA Inhaler 1 Puff(s) Inhalation every 4 hours  dexAMETHasone  Injectable 6 milliGRAM(s) IV Push daily  enoxaparin Injectable 40 milliGRAM(s) SubCutaneous daily  potassium phosphate / sodium phosphate Powder (PHOS-NaK) 1 Packet(s) Oral four times a day with meals  remdesivir  IVPB 100 milliGRAM(s) IV Intermittent every 24 hours  remdesivir  IVPB   IV Intermittent     MEDICATIONS  (PRN):  acetaminophen   Tablet .. 650 milliGRAM(s) Oral every 6 hours PRN Temp greater or equal to 38C (100.4F)      CAPILLARY BLOOD GLUCOSE        I&O's Summary    03 Oct 2020 07:01  -  04 Oct 2020 07:00  --------------------------------------------------------  IN: 0 mL / OUT: 0 mL / NET: 0 mL    04 Oct 2020 07:01  -  04 Oct 2020 14:15  --------------------------------------------------------  IN: 200 mL / OUT: 0 mL / NET: 200 mL        PHYSICAL EXAM:  Vital Signs Last 24 Hrs  T(C): 36.7 (04 Oct 2020 09:30), Max: 39.3 (03 Oct 2020 15:28)  T(F): 98.1 (04 Oct 2020 09:30), Max: 102.7 (03 Oct 2020 15:28)  HR: 96 (04 Oct 2020 11:30) (67 - 117)  BP: 122/73 (04 Oct 2020 09:30) (112/72 - 166/66)  BP(mean): 86 (03 Oct 2020 18:22) (74 - 86)  RR: 20 (04 Oct 2020 09:30) (16 - 36)  SpO2: 92% (04 Oct 2020 11:30) (92% - 98%)  CONSTITUTIONAL: NAD, well-developed, on BIPAP  RESPIRATORY: Normal respiratory effort; lungs are clear to auscultation bilaterally  CARDIOVASCULAR: Regular rate and rhythm, normal S1 and S2, no murmur/rub/gallop; No lower extremity edema; Peripheral pulses are 2+ bilaterally  ABDOMEN: Nontender to palpation, normoactive bowel sounds, no rebound/guarding; No hepatosplenomegaly  MUSCULOSKELETAL: no clubbing or cyanosis of digits; no joint swelling or tenderness to palpation  PSYCH: A+O to person, place, and time; affect appropriate    LABS:                        12.5   18.15 )-----------( 316      ( 04 Oct 2020 06:50 )             38.1     10-04    139  |  104  |  10  ----------------------------<  145<H>  3.8   |  23  |  0.41<L>    Ca    9.0      04 Oct 2020 06:50  Phos  2.0     10-04  Mg     2.3     10-04    TPro  7.3  /  Alb  3.7  /  TBili  0.3  /  DBili  x   /  AST  27  /  ALT  23  /  AlkPhos  93  10-04    PT/INR - ( 04 Oct 2020 06:50 )   PT: 12.9 SEC;   INR: 1.13          PTT - ( 04 Oct 2020 06:50 )  PTT:37.3 SEC      Urinalysis Basic - ( 03 Oct 2020 21:56 )    Color: YELLOW / Appearance: CLEAR / S.033 / pH: 6.5  Gluc: NEGATIVE / Ketone: MODERATE  / Bili: NEGATIVE / Urobili: NORMAL   Blood: NEGATIVE / Protein: 300 / Nitrite: NEGATIVE   Leuk Esterase: NEGATIVE / RBC: 3-5 / WBC 3-5   Sq Epi: FEW / Non Sq Epi: x / Bacteria: NEGATIVE      RADIOLOGY & ADDITIONAL TESTS:  Results Reviewed:   Imaging Personally Reviewed:  < from: Xray Chest 1 View- PORTABLE-Urgent (10.03.20 @ 16:05) >  IMPRESSION:  Limited inspiration, right lower lung linear atelectasis, left lower lung atelectasis vs pneumonia.      Electrocardiogram Personally Reviewed:    COORDINATION OF CARE:  Care Discussed with Consultants/Other Providers [Y/N]:   Prior or Outpatient Records Reviewed [Y/N]:

## 2020-10-04 NOTE — PROGRESS NOTE ADULT - ASSESSMENT
56 yo F without PMH presents to ED with 5 days of fever, cough, shortness of breath found hypoxic in ED requiring bipap and with +COVID-19 to be admitted for further management.

## 2020-10-04 NOTE — PROGRESS NOTE ADULT - PROBLEM SELECTOR PLAN 2
- patient SARS-CoV-2 positive,   - On Bipap minimal setting 10/5 45%. MICU recommended considering wean off as possible. appreciate recs  - Remdesivir 200mg IVP X1, then 100mg qday x4 day  - decadron 6mg IVP qday, up to 10 days

## 2020-10-04 NOTE — PROGRESS NOTE ADULT - PROBLEM SELECTOR PLAN 2
COVID PCR positive on 10/3  - On BiPAP @ 10/5, 40% FiO2  - C/w Remdesivir and Decadron x5 days  - Monitor daily renal and hepatic function while on Remdesivir   - Check COVID antibodies  - Lovenox for DVT ppx- BMI 28  - Trend biomarkers

## 2020-10-04 NOTE — PROGRESS NOTE ADULT - PROBLEM SELECTOR PLAN 1
likely 2/2 +Sars-CoV-2, currently more comfortable appearing on bipap  -ddimer negative, trop negative, although ekg with twi in inferior leads with no prior to compare to, possibly demand related?   -Currently on BIPAP 10/5, FiO2 45%, wean as tolerated  -covid treatment in progress with remdesivir and decadron  -monitor cont. pulse ox.   -albuterol as needed

## 2020-10-04 NOTE — PROGRESS NOTE ADULT - ASSESSMENT
54 yo F without PMH presents to ED with 5 days of fever, cough, shortness of breath found hypoxic in ED requiring bipap and with +COVID-19 to be admitted for further management.

## 2020-10-05 LAB
ALBUMIN SERPL ELPH-MCNC: 3.3 G/DL — SIGNIFICANT CHANGE UP (ref 3.3–5)
ALP SERPL-CCNC: 92 U/L — SIGNIFICANT CHANGE UP (ref 40–120)
ALT FLD-CCNC: 20 U/L — SIGNIFICANT CHANGE UP (ref 4–33)
ANION GAP SERPL CALC-SCNC: 12 MMO/L — SIGNIFICANT CHANGE UP (ref 7–14)
AST SERPL-CCNC: 25 U/L — SIGNIFICANT CHANGE UP (ref 4–32)
BASOPHILS # BLD AUTO: 0.01 K/UL — SIGNIFICANT CHANGE UP (ref 0–0.2)
BASOPHILS NFR BLD AUTO: 0.1 % — SIGNIFICANT CHANGE UP (ref 0–2)
BILIRUB SERPL-MCNC: 0.3 MG/DL — SIGNIFICANT CHANGE UP (ref 0.2–1.2)
BUN SERPL-MCNC: 15 MG/DL — SIGNIFICANT CHANGE UP (ref 7–23)
CALCIUM SERPL-MCNC: 8.8 MG/DL — SIGNIFICANT CHANGE UP (ref 8.4–10.5)
CHLORIDE SERPL-SCNC: 101 MMOL/L — SIGNIFICANT CHANGE UP (ref 98–107)
CO2 SERPL-SCNC: 25 MMOL/L — SIGNIFICANT CHANGE UP (ref 22–31)
CREAT SERPL-MCNC: 0.47 MG/DL — LOW (ref 0.5–1.3)
EOSINOPHIL # BLD AUTO: 0 K/UL — SIGNIFICANT CHANGE UP (ref 0–0.5)
EOSINOPHIL NFR BLD AUTO: 0 % — SIGNIFICANT CHANGE UP (ref 0–6)
GLUCOSE SERPL-MCNC: 100 MG/DL — HIGH (ref 70–99)
HCT VFR BLD CALC: 37.1 % — SIGNIFICANT CHANGE UP (ref 34.5–45)
HGB BLD-MCNC: 11.9 G/DL — SIGNIFICANT CHANGE UP (ref 11.5–15.5)
IMM GRANULOCYTES NFR BLD AUTO: 0.5 % — SIGNIFICANT CHANGE UP (ref 0–1.5)
LYMPHOCYTES # BLD AUTO: 1.43 K/UL — SIGNIFICANT CHANGE UP (ref 1–3.3)
LYMPHOCYTES # BLD AUTO: 9.8 % — LOW (ref 13–44)
MAGNESIUM SERPL-MCNC: 2.3 MG/DL — SIGNIFICANT CHANGE UP (ref 1.6–2.6)
MCHC RBC-ENTMCNC: 28.2 PG — SIGNIFICANT CHANGE UP (ref 27–34)
MCHC RBC-ENTMCNC: 32.1 % — SIGNIFICANT CHANGE UP (ref 32–36)
MCV RBC AUTO: 87.9 FL — SIGNIFICANT CHANGE UP (ref 80–100)
MONOCYTES # BLD AUTO: 0.71 K/UL — SIGNIFICANT CHANGE UP (ref 0–0.9)
MONOCYTES NFR BLD AUTO: 4.8 % — SIGNIFICANT CHANGE UP (ref 2–14)
NEUTROPHILS # BLD AUTO: 12.43 K/UL — HIGH (ref 1.8–7.4)
NEUTROPHILS NFR BLD AUTO: 84.8 % — HIGH (ref 43–77)
NRBC # FLD: 0 K/UL — SIGNIFICANT CHANGE UP (ref 0–0)
PHOSPHATE SERPL-MCNC: 3.1 MG/DL — SIGNIFICANT CHANGE UP (ref 2.5–4.5)
PLATELET # BLD AUTO: 379 K/UL — SIGNIFICANT CHANGE UP (ref 150–400)
PMV BLD: 8.8 FL — SIGNIFICANT CHANGE UP (ref 7–13)
POTASSIUM SERPL-MCNC: 4.1 MMOL/L — SIGNIFICANT CHANGE UP (ref 3.5–5.3)
POTASSIUM SERPL-SCNC: 4.1 MMOL/L — SIGNIFICANT CHANGE UP (ref 3.5–5.3)
PROT SERPL-MCNC: 6.9 G/DL — SIGNIFICANT CHANGE UP (ref 6–8.3)
RBC # BLD: 4.22 M/UL — SIGNIFICANT CHANGE UP (ref 3.8–5.2)
RBC # FLD: 14 % — SIGNIFICANT CHANGE UP (ref 10.3–14.5)
SODIUM SERPL-SCNC: 138 MMOL/L — SIGNIFICANT CHANGE UP (ref 135–145)
WBC # BLD: 14.66 K/UL — HIGH (ref 3.8–10.5)
WBC # FLD AUTO: 14.66 K/UL — HIGH (ref 3.8–10.5)

## 2020-10-05 PROCEDURE — 99233 SBSQ HOSP IP/OBS HIGH 50: CPT

## 2020-10-05 RX ADMIN — ALBUTEROL 1 PUFF(S): 90 AEROSOL, METERED ORAL at 09:31

## 2020-10-05 RX ADMIN — ALBUTEROL 1 PUFF(S): 90 AEROSOL, METERED ORAL at 00:50

## 2020-10-05 RX ADMIN — Medication 6 MILLIGRAM(S): at 06:42

## 2020-10-05 RX ADMIN — ALBUTEROL 1 PUFF(S): 90 AEROSOL, METERED ORAL at 12:52

## 2020-10-05 RX ADMIN — ALBUTEROL 1 PUFF(S): 90 AEROSOL, METERED ORAL at 19:02

## 2020-10-05 RX ADMIN — REMDESIVIR 500 MILLIGRAM(S): 5 INJECTION INTRAVENOUS at 23:40

## 2020-10-05 RX ADMIN — ALBUTEROL 1 PUFF(S): 90 AEROSOL, METERED ORAL at 23:27

## 2020-10-05 RX ADMIN — ALBUTEROL 1 PUFF(S): 90 AEROSOL, METERED ORAL at 06:41

## 2020-10-05 RX ADMIN — ENOXAPARIN SODIUM 40 MILLIGRAM(S): 100 INJECTION SUBCUTANEOUS at 12:53

## 2020-10-05 NOTE — PROVIDER CONTACT NOTE (OTHER) - SITUATION
patient on bipap not tolerating O2 80/82 percent patient stating cant eat upset she is not on high flow can even speak her family with the bipap very upset

## 2020-10-05 NOTE — PROGRESS NOTE ADULT - SUBJECTIVE AND OBJECTIVE BOX
[facility] Division of Hospital Medicine  Tosha Pillai MD  Pager (M-F, 8A-5P): [phone]  Other Times:  [phone]    Patient is a 55y old  Female who presents with a chief complaint of SOB (04 Oct 2020 15:13)      SUBJECTIVE / OVERNIGHT EVENTS:  pt still requiring Bipap - she desats once off bipap.  she continues to feel sob.      MEDICATIONS  (STANDING):  ALBUTerol    90 MICROgram(s) HFA Inhaler 1 Puff(s) Inhalation every 4 hours  dexAMETHasone  Injectable 6 milliGRAM(s) IV Push daily  enoxaparin Injectable 40 milliGRAM(s) SubCutaneous daily  remdesivir  IVPB 100 milliGRAM(s) IV Intermittent every 24 hours  remdesivir  IVPB   IV Intermittent     MEDICATIONS  (PRN):  acetaminophen   Tablet .. 650 milliGRAM(s) Oral every 6 hours PRN Temp greater or equal to 38C (100.4F)      CAPILLARY BLOOD GLUCOSE        I&O's Summary    04 Oct 2020 07:01  -  05 Oct 2020 07:00  --------------------------------------------------------  IN: 1190 mL / OUT: 500 mL / NET: 690 mL        PHYSICAL EXAM:  Vital Signs Last 24 Hrs  T(C): 36.3 (05 Oct 2020 09:27), Max: 37.1 (04 Oct 2020 21:23)  T(F): 97.3 (05 Oct 2020 09:27), Max: 98.7 (04 Oct 2020 21:23)  HR: 81 (05 Oct 2020 11:02) (76 - 94)  BP: 110/69 (05 Oct 2020 09:27) (106/68 - 126/70)  BP(mean): --  RR: 23 (05 Oct 2020 13:33) (20 - 23)  SpO2: 92% (05 Oct 2020 13:33) (92% - 97%)    CONSTITUTIONAL: NAD, well-developed, well-groomed  EYES: EOMI; conjunctiva and sclera clear  ENMT: Moist oral mucosa; + Bipap   RESPIRATORY: Normal respiratory effort; lungs are clear to auscultation bilaterally  CARDIOVASCULAR: Regular rate and rhythm, normal S1 and S2, no murmur; No lower extremity edema  ABDOMEN: Nontender to palpation, normoactive bowel sounds, no rebound/guarding  MUSCULOSKELETAL:   no clubbing or cyanosis of digits; no joint swelling or tenderness to palpation  PSYCH: A+O to person, place, and time; affect appropriate  NEUROLOGY: CN 2-12 are intact and symmetric; no gross sensory deficits   SKIN: No rashes; no palpable lesions    LABS:                        11.9   14.66 )-----------( 379      ( 05 Oct 2020 06:10 )             37.1     10-05    138  |  101  |  15  ----------------------------<  100<H>  4.1   |  25  |  0.47<L>    Ca    8.8      05 Oct 2020 06:10  Phos  3.1     10-05  Mg     2.3     10-05    TPro  6.9  /  Alb  3.3  /  TBili  0.3  /  DBili  x   /  AST  25  /  ALT  20  /  AlkPhos  92  10-05    PT/INR - ( 04 Oct 2020 06:50 )   PT: 12.9 SEC;   INR: 1.13          PTT - ( 04 Oct 2020 06:50 )  PTT:37.3 SEC      Urinalysis Basic - ( 03 Oct 2020 21:56 )    Color: YELLOW / Appearance: CLEAR / S.033 / pH: 6.5  Gluc: NEGATIVE / Ketone: MODERATE  / Bili: NEGATIVE / Urobili: NORMAL   Blood: NEGATIVE / Protein: 300 / Nitrite: NEGATIVE   Leuk Esterase: NEGATIVE / RBC: 3-5 / WBC 3-5   Sq Epi: FEW / Non Sq Epi: x / Bacteria: NEGATIVE        Culture - Urine (collected 04 Oct 2020 03:50)  Source: .Urine Clean Catch (Midstream)  Preliminary Report (05 Oct 2020 11:19):    10,000 - 49,000 CFU/mL Gram Negative Rods    <10,000 CFU/ml Normal Urogenital karoline present    Culture - Blood (collected 03 Oct 2020 21:02)  Source: .Blood Blood-Peripheral  Preliminary Report (04 Oct 2020 22:01):    No growth to date.    Culture - Blood (collected 03 Oct 2020 21:02)  Source: .Blood Blood-Peripheral  Preliminary Report (04 Oct 2020 22:01):    No growth to date.      RADIOLOGY & ADDITIONAL TESTS:  Results Reviewed:   Imaging Personally Reviewed:  Electrocardiogram Personally Reviewed:    COORDINATION OF CARE:  Care Discussed with Consultants/Other Providers [Y/N]: Y  Prior or Outpatient Records Reviewed [Y/N]: Y

## 2020-10-05 NOTE — PROGRESS NOTE ADULT - PROBLEM SELECTOR PLAN 1
Likely 2/2 COVID, currently on BiPAP  -ddimer WNL, trop negative, although ekg with twi in inferior leads with no prior to compare to, possibly demand related - no chest pain   -continue remdesivir (10/3-7) and decadron (10/3-)  -monitor cont. pulse ox. wean off bipap as tolerated.  -albuterol as needed

## 2020-10-05 NOTE — PROGRESS NOTE ADULT - PROBLEM SELECTOR PLAN 2
COVID PCR positive on 10/3  - On BiPAP @ 10/5, 40% FiO2  -continue remdesivir (10/3-7) and decadron (10/3-)  - Monitor daily renal and hepatic function while on Remdesivir   - Check COVID antibodies  - Lovenox for DVT ppx- BMI 28  - Trend biomarkers

## 2020-10-06 LAB
-  AMIKACIN: SIGNIFICANT CHANGE UP
-  AMOXICILLIN/CLAVULANIC ACID: SIGNIFICANT CHANGE UP
-  AMPICILLIN/SULBACTAM: SIGNIFICANT CHANGE UP
-  AMPICILLIN: SIGNIFICANT CHANGE UP
-  AZTREONAM: SIGNIFICANT CHANGE UP
-  CEFAZOLIN: SIGNIFICANT CHANGE UP
-  CEFEPIME: SIGNIFICANT CHANGE UP
-  CEFOXITIN: SIGNIFICANT CHANGE UP
-  CEFTRIAXONE: SIGNIFICANT CHANGE UP
-  CIPROFLOXACIN: SIGNIFICANT CHANGE UP
-  ERTAPENEM: SIGNIFICANT CHANGE UP
-  GENTAMICIN: SIGNIFICANT CHANGE UP
-  IMIPENEM: SIGNIFICANT CHANGE UP
-  LEVOFLOXACIN: SIGNIFICANT CHANGE UP
-  MEROPENEM: SIGNIFICANT CHANGE UP
-  NITROFURANTOIN: SIGNIFICANT CHANGE UP
-  PIPERACILLIN/TAZOBACTAM: SIGNIFICANT CHANGE UP
-  TIGECYCLINE: SIGNIFICANT CHANGE UP
-  TOBRAMYCIN: SIGNIFICANT CHANGE UP
-  TRIMETHOPRIM/SULFAMETHOXAZOLE: SIGNIFICANT CHANGE UP
ALBUMIN SERPL ELPH-MCNC: 3.3 G/DL — SIGNIFICANT CHANGE UP (ref 3.3–5)
ALP SERPL-CCNC: 91 U/L — SIGNIFICANT CHANGE UP (ref 40–120)
ALT FLD-CCNC: 17 U/L — SIGNIFICANT CHANGE UP (ref 4–33)
ANION GAP SERPL CALC-SCNC: 11 MMO/L — SIGNIFICANT CHANGE UP (ref 7–14)
AST SERPL-CCNC: 18 U/L — SIGNIFICANT CHANGE UP (ref 4–32)
BASOPHILS # BLD AUTO: 0 K/UL — SIGNIFICANT CHANGE UP (ref 0–0.2)
BASOPHILS NFR BLD AUTO: 0 % — SIGNIFICANT CHANGE UP (ref 0–2)
BILIRUB SERPL-MCNC: 0.2 MG/DL — SIGNIFICANT CHANGE UP (ref 0.2–1.2)
BUN SERPL-MCNC: 18 MG/DL — SIGNIFICANT CHANGE UP (ref 7–23)
CALCIUM SERPL-MCNC: 8.9 MG/DL — SIGNIFICANT CHANGE UP (ref 8.4–10.5)
CHLORIDE SERPL-SCNC: 106 MMOL/L — SIGNIFICANT CHANGE UP (ref 98–107)
CO2 SERPL-SCNC: 24 MMOL/L — SIGNIFICANT CHANGE UP (ref 22–31)
CREAT SERPL-MCNC: 0.44 MG/DL — LOW (ref 0.5–1.3)
CULTURE RESULTS: SIGNIFICANT CHANGE UP
EOSINOPHIL # BLD AUTO: 0 K/UL — SIGNIFICANT CHANGE UP (ref 0–0.5)
EOSINOPHIL NFR BLD AUTO: 0 % — SIGNIFICANT CHANGE UP (ref 0–6)
GLUCOSE BLDC GLUCOMTR-MCNC: 126 MG/DL — HIGH (ref 70–99)
GLUCOSE BLDC GLUCOMTR-MCNC: 136 MG/DL — HIGH (ref 70–99)
GLUCOSE SERPL-MCNC: 118 MG/DL — HIGH (ref 70–99)
HCT VFR BLD CALC: 37.8 % — SIGNIFICANT CHANGE UP (ref 34.5–45)
HGB BLD-MCNC: 12.3 G/DL — SIGNIFICANT CHANGE UP (ref 11.5–15.5)
IMM GRANULOCYTES NFR BLD AUTO: 0.4 % — SIGNIFICANT CHANGE UP (ref 0–1.5)
LYMPHOCYTES # BLD AUTO: 0.78 K/UL — LOW (ref 1–3.3)
LYMPHOCYTES # BLD AUTO: 8.4 % — LOW (ref 13–44)
MAGNESIUM SERPL-MCNC: 2.2 MG/DL — SIGNIFICANT CHANGE UP (ref 1.6–2.6)
MCHC RBC-ENTMCNC: 28.7 PG — SIGNIFICANT CHANGE UP (ref 27–34)
MCHC RBC-ENTMCNC: 32.5 % — SIGNIFICANT CHANGE UP (ref 32–36)
MCV RBC AUTO: 88.1 FL — SIGNIFICANT CHANGE UP (ref 80–100)
METHOD TYPE: SIGNIFICANT CHANGE UP
MONOCYTES # BLD AUTO: 0.69 K/UL — SIGNIFICANT CHANGE UP (ref 0–0.9)
MONOCYTES NFR BLD AUTO: 7.4 % — SIGNIFICANT CHANGE UP (ref 2–14)
NEUTROPHILS # BLD AUTO: 7.8 K/UL — HIGH (ref 1.8–7.4)
NEUTROPHILS NFR BLD AUTO: 83.8 % — HIGH (ref 43–77)
NRBC # FLD: 0 K/UL — SIGNIFICANT CHANGE UP (ref 0–0)
ORGANISM # SPEC MICROSCOPIC CNT: SIGNIFICANT CHANGE UP
ORGANISM # SPEC MICROSCOPIC CNT: SIGNIFICANT CHANGE UP
PHOSPHATE SERPL-MCNC: 3.3 MG/DL — SIGNIFICANT CHANGE UP (ref 2.5–4.5)
PLATELET # BLD AUTO: 417 K/UL — HIGH (ref 150–400)
PMV BLD: 9 FL — SIGNIFICANT CHANGE UP (ref 7–13)
POTASSIUM SERPL-MCNC: 4.3 MMOL/L — SIGNIFICANT CHANGE UP (ref 3.5–5.3)
POTASSIUM SERPL-SCNC: 4.3 MMOL/L — SIGNIFICANT CHANGE UP (ref 3.5–5.3)
PROT SERPL-MCNC: 6.8 G/DL — SIGNIFICANT CHANGE UP (ref 6–8.3)
RBC # BLD: 4.29 M/UL — SIGNIFICANT CHANGE UP (ref 3.8–5.2)
RBC # FLD: 14.1 % — SIGNIFICANT CHANGE UP (ref 10.3–14.5)
SODIUM SERPL-SCNC: 141 MMOL/L — SIGNIFICANT CHANGE UP (ref 135–145)
SPECIMEN SOURCE: SIGNIFICANT CHANGE UP
WBC # BLD: 9.31 K/UL — SIGNIFICANT CHANGE UP (ref 3.8–10.5)
WBC # FLD AUTO: 9.31 K/UL — SIGNIFICANT CHANGE UP (ref 3.8–10.5)

## 2020-10-06 PROCEDURE — 99233 SBSQ HOSP IP/OBS HIGH 50: CPT

## 2020-10-06 RX ORDER — DEXTROSE 50 % IN WATER 50 %
12.5 SYRINGE (ML) INTRAVENOUS ONCE
Refills: 0 | Status: DISCONTINUED | OUTPATIENT
Start: 2020-10-06 | End: 2020-10-17

## 2020-10-06 RX ORDER — DEXTROSE 50 % IN WATER 50 %
25 SYRINGE (ML) INTRAVENOUS ONCE
Refills: 0 | Status: DISCONTINUED | OUTPATIENT
Start: 2020-10-06 | End: 2020-10-17

## 2020-10-06 RX ORDER — GLUCAGON INJECTION, SOLUTION 0.5 MG/.1ML
1 INJECTION, SOLUTION SUBCUTANEOUS ONCE
Refills: 0 | Status: DISCONTINUED | OUTPATIENT
Start: 2020-10-06 | End: 2020-10-17

## 2020-10-06 RX ORDER — SODIUM CHLORIDE 9 MG/ML
1000 INJECTION, SOLUTION INTRAVENOUS
Refills: 0 | Status: DISCONTINUED | OUTPATIENT
Start: 2020-10-06 | End: 2020-10-17

## 2020-10-06 RX ORDER — INSULIN LISPRO 100/ML
VIAL (ML) SUBCUTANEOUS
Refills: 0 | Status: DISCONTINUED | OUTPATIENT
Start: 2020-10-06 | End: 2020-10-17

## 2020-10-06 RX ORDER — DEXTROSE 50 % IN WATER 50 %
15 SYRINGE (ML) INTRAVENOUS ONCE
Refills: 0 | Status: DISCONTINUED | OUTPATIENT
Start: 2020-10-06 | End: 2020-10-17

## 2020-10-06 RX ORDER — INSULIN LISPRO 100/ML
VIAL (ML) SUBCUTANEOUS AT BEDTIME
Refills: 0 | Status: DISCONTINUED | OUTPATIENT
Start: 2020-10-06 | End: 2020-10-17

## 2020-10-06 RX ADMIN — REMDESIVIR 500 MILLIGRAM(S): 5 INJECTION INTRAVENOUS at 23:15

## 2020-10-06 RX ADMIN — Medication 6 MILLIGRAM(S): at 05:34

## 2020-10-06 RX ADMIN — ENOXAPARIN SODIUM 40 MILLIGRAM(S): 100 INJECTION SUBCUTANEOUS at 11:24

## 2020-10-06 RX ADMIN — ALBUTEROL 1 PUFF(S): 90 AEROSOL, METERED ORAL at 23:15

## 2020-10-06 RX ADMIN — ALBUTEROL 1 PUFF(S): 90 AEROSOL, METERED ORAL at 07:05

## 2020-10-06 RX ADMIN — ALBUTEROL 1 PUFF(S): 90 AEROSOL, METERED ORAL at 11:21

## 2020-10-06 RX ADMIN — ALBUTEROL 1 PUFF(S): 90 AEROSOL, METERED ORAL at 03:53

## 2020-10-06 RX ADMIN — ALBUTEROL 1 PUFF(S): 90 AEROSOL, METERED ORAL at 15:58

## 2020-10-06 RX ADMIN — ALBUTEROL 1 PUFF(S): 90 AEROSOL, METERED ORAL at 18:52

## 2020-10-06 NOTE — DISCHARGE NOTE PROVIDER - NSDCFUADDAPPT_GEN_ALL_CORE_FT
If you are in need of a general medicine physician and post-discharge medical follow-up for further care/recommendations you may contact the [facility] for an appointment [phone]

## 2020-10-06 NOTE — PROVIDER CONTACT NOTE (OTHER) - BACKGROUND
Patient admitted 10/3 Patient admitted 10/3 for shortness of breath, Patient admitted 10/3 for shortness of breath, fever, cough, positive COVID-19 diagnosis as of 10/3

## 2020-10-06 NOTE — DISCHARGE NOTE PROVIDER - NSDCCPCAREPLAN_GEN_ALL_CORE_FT
PRINCIPAL DISCHARGE DIAGNOSIS  Diagnosis: Acute hypoxemic respiratory failure  Assessment and Plan of Treatment: Likely secondary to COVID. Now resolved.      SECONDARY DISCHARGE DIAGNOSES  Diagnosis: COVID-19  Assessment and Plan of Treatment: You have been diagnosed with the COVID-19 virus during your hospital stay. You must self quarantine to complete a 14 day time period.  Monitor for fevers, shortness of breath and cough primarily.  Monitor your temperature daily to not any changes and increases.    It has been determined that you no longer need hospitalization and can recover while remaining in self-quarantine at home. You should follow the prevention steps below until a healthcare provider or local or state health department says you can return to your normal activities.  1. You should restrict activities outside your home, except for getting medical care.  2. Do not go to work, school, or public areas.  3. Avoid using public transportation, ride-sharing, or taxis.  4. Separate yourself from other people and animals in your home.  People: As much as possible, you should stay in a specific room and away from other people in your home. Also, you should use a separate bathroom, if available.  Animals: You should restrict contact with pets and other animals while you are sick with COVID-19, just like you would around other people. Although there have not been reports of pets or other animals becoming sick with COVID-19, it is still recommended that people sick with COVID-19 limit contact with animals until more information is known about the virus.  When possible, have another member of your household care for your animals while you are sick. If you must care for your pet or be around animals while you are sick, wash your hands before and after you interact with pets and wear a facemask.  5. Call ahead before visiting your doctor.  If you have a medical appointment, call the healthcare provider and tell them that you have or may have COVID-19. This will help the healthcare provider’s office take steps to keep other people from getting infected or exposed.  6. Wear a facemask.  You should wear a facemask when you are around other people (e.g., sharing a room or vehicle) or pets and before you enter a healthcare pro     PRINCIPAL DISCHARGE DIAGNOSIS  Diagnosis: Acute hypoxemic respiratory failure  Assessment and Plan of Treatment: Secondary to COVID. Now resolved.      SECONDARY DISCHARGE DIAGNOSES  Diagnosis: COVID-19  Assessment and Plan of Treatment: You have been diagnosed with the COVID-19 virus during your hospital stay. You must self quarantine to complete a 14 day time period.  Monitor for fevers, shortness of breath and cough primarily.  Monitor your temperature daily to not any changes and increases.    It has been determined that you no longer need hospitalization and can recover while remaining in self-quarantine at home   - You should restrict activities outside your home, except for getting medical care. Do not go to work, school, or public areas  - Avoid using public transportation, ride-sharing, or taxis  - Separate yourself from other people and animals in your home.  - Wear a facemask     PRINCIPAL DISCHARGE DIAGNOSIS  Diagnosis: Acute hypoxemic respiratory failure  Assessment and Plan of Treatment: Secondary to COVID. Now resolved.      SECONDARY DISCHARGE DIAGNOSES  Diagnosis: COVID-19  Assessment and Plan of Treatment: You have been diagnosed with the COVID-19 virus on 10/3 during your hospital stay. You must self quarantine to complete a 14 day time period.  Monitor for fevers, shortness of breath and cough primarily.  Monitor your temperature daily to not any changes and increases.    It has been determined that you no longer need hospitalization and can recover while remaining in self-quarantine at home   - You should restrict activities outside your home, except for getting medical care. Do not go to work, school, or public areas  - Avoid using public transportation, ride-sharing, or taxis  - Separate yourself from other people and animals in your home.  - Wear a facemask

## 2020-10-06 NOTE — PROGRESS NOTE ADULT - SUBJECTIVE AND OBJECTIVE BOX
[facility] Division of Hospital Medicine  Tosha Pillai MD  Pager (M-F, 8A-5P): [phone]  Other Times:  [phone]    Patient is a 55y old  Female who presents with a chief complaint of SOB (05 Oct 2020 14:39)      SUBJECTIVE / OVERNIGHT EVENTS:  pt on HFNC this am - seen eating, she looks comfortable but SO2 ranging 87-91.      MEDICATIONS  (STANDING):  ALBUTerol    90 MICROgram(s) HFA Inhaler 1 Puff(s) Inhalation every 4 hours  dexAMETHasone  Injectable 6 milliGRAM(s) IV Push daily  enoxaparin Injectable 40 milliGRAM(s) SubCutaneous daily  remdesivir  IVPB 100 milliGRAM(s) IV Intermittent every 24 hours  remdesivir  IVPB   IV Intermittent     MEDICATIONS  (PRN):  acetaminophen   Tablet .. 650 milliGRAM(s) Oral every 6 hours PRN Temp greater or equal to 38C (100.4F)      CAPILLARY BLOOD GLUCOSE        I&O's Summary    05 Oct 2020 07:01  -  06 Oct 2020 07:00  --------------------------------------------------------  IN: 1050 mL / OUT: 525 mL / NET: 525 mL        PHYSICAL EXAM:  Vital Signs Last 24 Hrs  T(C): 36.4 (06 Oct 2020 09:40), Max: 36.6 (05 Oct 2020 14:30)  T(F): 97.6 (06 Oct 2020 09:40), Max: 97.9 (05 Oct 2020 14:30)  HR: 70 (06 Oct 2020 09:40) (58 - 90)  BP: 120/64 (06 Oct 2020 09:40) (116/60 - 132/80)  BP(mean): --  RR: 20 (06 Oct 2020 10:29) (18 - 24)  SpO2: 92% (06 Oct 2020 10:29) (82% - 98%)    CONSTITUTIONAL: NAD, well-developed, well-groomed  EYES: EOMI; conjunctiva and sclera clear  ENMT: Moist oral mucosa; + Bipap   RESPIRATORY: Normal respiratory effort; lungs are clear to auscultation bilaterally  CARDIOVASCULAR: Regular rate and rhythm, normal S1 and S2, no murmur; No lower extremity edema  ABDOMEN: Nontender to palpation, normoactive bowel sounds, no rebound/guarding  MUSCULOSKELETAL:   no clubbing or cyanosis of digits; no joint swelling or tenderness to palpation  PSYCH: A+O to person, place, and time; affect appropriate  NEUROLOGY: CN 2-12 are intact and symmetric; no gross sensory deficits   SKIN: No rashes; no palpable lesions    LABS:                        12.3   9.31  )-----------( 417      ( 06 Oct 2020 06:50 )             37.8     10-06    141  |  106  |  18  ----------------------------<  118<H>  4.3   |  24  |  0.44<L>    Ca    8.9      06 Oct 2020 06:50  Phos  3.3     10-06  Mg     2.2     10-06    TPro  6.8  /  Alb  3.3  /  TBili  0.2  /  DBili  x   /  AST  18  /  ALT  17  /  AlkPhos  91  10-06      Culture - Urine (collected 04 Oct 2020 03:50)  Source: .Urine Clean Catch (Midstream)  Preliminary Report (05 Oct 2020 17:54):    10,000 - 49,000 CFU/mL Escherichia coli    <10,000 CFU/ml Normal Urogenital karoline present    Culture - Blood (collected 03 Oct 2020 21:02)  Source: .Blood Blood-Peripheral  Preliminary Report (04 Oct 2020 22:01):    No growth to date.    Culture - Blood (collected 03 Oct 2020 21:02)  Source: .Blood Blood-Peripheral  Preliminary Report (04 Oct 2020 22:01):    No growth to date.      RADIOLOGY & ADDITIONAL TESTS:  Results Reviewed:   Imaging Personally Reviewed:  Electrocardiogram Personally Reviewed:    COORDINATION OF CARE:  Care Discussed with Consultants/Other Providers [Y/N]: Y  Prior or Outpatient Records Reviewed [Y/N]: Y

## 2020-10-06 NOTE — DISCHARGE NOTE PROVIDER - NSDCCAREPROVSEEN_GEN_ALL_CORE_FT
[facility] Medicine ACP, Team  Tosha Pillai [facility] Medicine ACP, Team  Tosha Pillai [facility] Medicine ACP, Team    German Payne [facility] Medicine ACP, Team  German Payne

## 2020-10-06 NOTE — DISCHARGE NOTE PROVIDER - HOSPITAL COURSE
Patient is a 54 yo F with no significant PMH presented with SOB x 5 days, w/ loss of sense of smell and taste, nausea, fever, diarrhea, and nonproductive cough as well. Denies sick contacts but reported her  recently had a fever.     Patient stated she had the COVID antibody test done recently and was negative. On the day prior to admission she presented to her PCP and was prescribed a Z-pack. In route, EMS she was hypoxic to 80s on RA, improved to 93% on 6L NC.     Pt was admitted to the hospital and was found to have COVID 19 (10/03). Pt was treated with dex and remdesivir. Pt was also managed supportively with oxygen supplementation. Pt was weaned off supplemental oxygen and was saturating well on room air prior to discharge.     Dispo- On ___ this case was reviewed with  ____, the patient is medically stable and optimized for discharge. All medications were reviewed and prescriptions were sent to mutually agreed upon pharmacy.   Patient is a 56 yo F with no significant PMH presented with SOB x 5 days, w/ loss of sense of smell and taste, nausea, fever, diarrhea, and nonproductive cough as well. Denies sick contacts but reported her  recently had a fever.     Patient stated she had the COVID antibody test done recently and was negative. On the day prior to admission she presented to her PCP and was prescribed a Z-pack. In route, EMS she was hypoxic to 80s on RA, improved to 93% on 6L NC.     Pt was admitted to the hospital and was found to have COVID 19 (10/03). Pt was treated with dexamethasone and Remdesivir. Pt was also managed supportively with oxygen supplementation. Pt was weaned off supplemental oxygen and was saturating well on room air prior to discharge.     Dispo- On ___ this case was reviewed with Dr Payne, the patient is medically stable and optimized for discharge. All medications were reviewed and prescriptions were sent to mutually agreed upon pharmacy.   Patient is a 56 yo F with no significant PMH presented with SOB x 5 days, w/ loss of sense of smell and taste, nausea, fever, diarrhea, and nonproductive cough as well. Denies sick contacts but reported her  recently had a fever.     Patient stated she had the COVID antibody test done recently and was negative. On the day prior to admission she presented to her PCP and was prescribed a Z-pack. In route, EMS she was hypoxic to 80s on RA. MICU evaluated but she was not deemed a candidate for MICU transfer.     Pt was admitted to Medicine and was found to have COVID 19 (10/03). Pt was treated with dexamethasone and Remdesivir. Pt was also managed supportively with oxygen supplementation w/ HFNC. Pt was eventually weaned off supplemental oxygen and was saturating well on room air prior to discharge.     Dispo- On 10/17 this case was reviewed with Dr Payne, the patient is medically stable and optimized for discharge. All medications were reviewed and prescriptions were sent to mutually agreed upon pharmacy.

## 2020-10-06 NOTE — PROVIDER CONTACT NOTE (OTHER) - ACTION/TREATMENT ORDERED:
Per provider increase to Nasal cannula to 80, try proning patient and chest PT. Per provider increase to Nasal cannula to FiO2 80, try proning patient and chest PT. Per provider increase FiO2 80, currently set at 75, and try proning patient and chest PT. After speaking with provider, patient SPO2 back up to 91% on high flow nasal cannula without intervention.

## 2020-10-06 NOTE — PROGRESS NOTE ADULT - PROBLEM SELECTOR PLAN 2
COVID PCR positive on 10/3  - On BiPAP @ 10/5, 40% FiO2 but transitioning to high flow as tolerated  -continue remdesivir (10/3-7) and decadron (10/3-)  - Monitor daily renal and hepatic function while on Remdesivir   - Check COVID antibodies  - Lovenox for DVT ppx- BMI 28  - Trend biomarkers

## 2020-10-06 NOTE — DISCHARGE NOTE PROVIDER - NSDCMRMEDTOKEN_GEN_ALL_CORE_FT
acetaminophen 325 mg oral tablet: 2 tab(s) orally every 6 hours, As needed, Temp greater or equal to 38C (100.4F), Moderate Pain (4 - 6)  polyethylene glycol 3350 oral powder for reconstitution: 17 gram(s) orally once a day  senna oral tablet: 1 tab(s) orally once a day   acetaminophen 325 mg oral tablet: 2 tab(s) orally every 6 hours, As needed, Temp greater or equal to 38C (100.4F), Moderate Pain (4 - 6)  albuterol 90 mcg/inh inhalation aerosol: 1 puff(s) inhaled every 4 hoursas needed for shortness of breath  polyethylene glycol 3350 oral powder for reconstitution: 17 gram(s) orally once a day  senna oral tablet: 1 tab(s) orally once a day

## 2020-10-06 NOTE — PROGRESS NOTE ADULT - PROBLEM SELECTOR PLAN 1
Likely 2/2 COVID, currently on BiPAP  -continue remdesivir (10/3-7) and decadron (10/3-)  -monitor cont. pulse ox. wean off bipap as tolerated.  -albuterol as needed

## 2020-10-07 LAB
ALBUMIN SERPL ELPH-MCNC: 3.2 G/DL — LOW (ref 3.3–5)
ALP SERPL-CCNC: 85 U/L — SIGNIFICANT CHANGE UP (ref 40–120)
ALT FLD-CCNC: 21 U/L — SIGNIFICANT CHANGE UP (ref 4–33)
ANION GAP SERPL CALC-SCNC: 12 MMO/L — SIGNIFICANT CHANGE UP (ref 7–14)
AST SERPL-CCNC: 26 U/L — SIGNIFICANT CHANGE UP (ref 4–32)
BASOPHILS # BLD AUTO: 0.02 K/UL — SIGNIFICANT CHANGE UP (ref 0–0.2)
BASOPHILS NFR BLD AUTO: 0.2 % — SIGNIFICANT CHANGE UP (ref 0–2)
BILIRUB SERPL-MCNC: 0.2 MG/DL — SIGNIFICANT CHANGE UP (ref 0.2–1.2)
BUN SERPL-MCNC: 22 MG/DL — SIGNIFICANT CHANGE UP (ref 7–23)
CALCIUM SERPL-MCNC: 8.9 MG/DL — SIGNIFICANT CHANGE UP (ref 8.4–10.5)
CHLORIDE SERPL-SCNC: 104 MMOL/L — SIGNIFICANT CHANGE UP (ref 98–107)
CO2 SERPL-SCNC: 23 MMOL/L — SIGNIFICANT CHANGE UP (ref 22–31)
CREAT SERPL-MCNC: 0.48 MG/DL — LOW (ref 0.5–1.3)
CRP SERPL-MCNC: 27.9 MG/L — HIGH
D DIMER BLD IA.RAPID-MCNC: 154 NG/ML — SIGNIFICANT CHANGE UP
EOSINOPHIL # BLD AUTO: 0 K/UL — SIGNIFICANT CHANGE UP (ref 0–0.5)
EOSINOPHIL NFR BLD AUTO: 0 % — SIGNIFICANT CHANGE UP (ref 0–6)
FERRITIN SERPL-MCNC: 249.2 NG/ML — HIGH (ref 15–150)
GLUCOSE BLDC GLUCOMTR-MCNC: 120 MG/DL — HIGH (ref 70–99)
GLUCOSE BLDC GLUCOMTR-MCNC: 123 MG/DL — HIGH (ref 70–99)
GLUCOSE BLDC GLUCOMTR-MCNC: 124 MG/DL — HIGH (ref 70–99)
GLUCOSE BLDC GLUCOMTR-MCNC: 168 MG/DL — HIGH (ref 70–99)
GLUCOSE SERPL-MCNC: 114 MG/DL — HIGH (ref 70–99)
HBA1C BLD-MCNC: 6.3 % — HIGH (ref 4–5.6)
HCT VFR BLD CALC: 42.4 % — SIGNIFICANT CHANGE UP (ref 34.5–45)
HGB BLD-MCNC: 13.4 G/DL — SIGNIFICANT CHANGE UP (ref 11.5–15.5)
IMM GRANULOCYTES NFR BLD AUTO: 1.1 % — SIGNIFICANT CHANGE UP (ref 0–1.5)
LDH SERPL L TO P-CCNC: 248 U/L — HIGH (ref 135–225)
LYMPHOCYTES # BLD AUTO: 0.42 K/UL — LOW (ref 1–3.3)
LYMPHOCYTES # BLD AUTO: 3.4 % — LOW (ref 13–44)
MAGNESIUM SERPL-MCNC: 2.2 MG/DL — SIGNIFICANT CHANGE UP (ref 1.6–2.6)
MCHC RBC-ENTMCNC: 28.5 PG — SIGNIFICANT CHANGE UP (ref 27–34)
MCHC RBC-ENTMCNC: 31.6 % — LOW (ref 32–36)
MCV RBC AUTO: 90 FL — SIGNIFICANT CHANGE UP (ref 80–100)
MONOCYTES # BLD AUTO: 0.57 K/UL — SIGNIFICANT CHANGE UP (ref 0–0.9)
MONOCYTES NFR BLD AUTO: 4.7 % — SIGNIFICANT CHANGE UP (ref 2–14)
NEUTROPHILS # BLD AUTO: 11.09 K/UL — HIGH (ref 1.8–7.4)
NEUTROPHILS NFR BLD AUTO: 90.6 % — HIGH (ref 43–77)
NRBC # FLD: 0 K/UL — SIGNIFICANT CHANGE UP (ref 0–0)
PHOSPHATE SERPL-MCNC: 3.7 MG/DL — SIGNIFICANT CHANGE UP (ref 2.5–4.5)
PLATELET # BLD AUTO: 519 K/UL — HIGH (ref 150–400)
PMV BLD: 8.9 FL — SIGNIFICANT CHANGE UP (ref 7–13)
POTASSIUM SERPL-MCNC: 4.4 MMOL/L — SIGNIFICANT CHANGE UP (ref 3.5–5.3)
POTASSIUM SERPL-SCNC: 4.4 MMOL/L — SIGNIFICANT CHANGE UP (ref 3.5–5.3)
PROCALCITONIN SERPL-MCNC: 0.15 NG/ML — HIGH (ref 0.02–0.1)
PROT SERPL-MCNC: 6.7 G/DL — SIGNIFICANT CHANGE UP (ref 6–8.3)
RBC # BLD: 4.71 M/UL — SIGNIFICANT CHANGE UP (ref 3.8–5.2)
RBC # FLD: 13.4 % — SIGNIFICANT CHANGE UP (ref 10.3–14.5)
SODIUM SERPL-SCNC: 139 MMOL/L — SIGNIFICANT CHANGE UP (ref 135–145)
WBC # BLD: 12.23 K/UL — HIGH (ref 3.8–10.5)
WBC # FLD AUTO: 12.23 K/UL — HIGH (ref 3.8–10.5)

## 2020-10-07 PROCEDURE — 99233 SBSQ HOSP IP/OBS HIGH 50: CPT

## 2020-10-07 RX ADMIN — ENOXAPARIN SODIUM 40 MILLIGRAM(S): 100 INJECTION SUBCUTANEOUS at 12:22

## 2020-10-07 RX ADMIN — REMDESIVIR 500 MILLIGRAM(S): 5 INJECTION INTRAVENOUS at 23:45

## 2020-10-07 RX ADMIN — Medication 1: at 11:57

## 2020-10-07 RX ADMIN — Medication 6 MILLIGRAM(S): at 05:40

## 2020-10-07 RX ADMIN — ALBUTEROL 1 PUFF(S): 90 AEROSOL, METERED ORAL at 04:38

## 2020-10-07 RX ADMIN — ALBUTEROL 1 PUFF(S): 90 AEROSOL, METERED ORAL at 17:25

## 2020-10-07 RX ADMIN — ALBUTEROL 1 PUFF(S): 90 AEROSOL, METERED ORAL at 21:44

## 2020-10-07 RX ADMIN — ALBUTEROL 1 PUFF(S): 90 AEROSOL, METERED ORAL at 08:55

## 2020-10-07 RX ADMIN — ALBUTEROL 1 PUFF(S): 90 AEROSOL, METERED ORAL at 12:23

## 2020-10-07 NOTE — PROGRESS NOTE ADULT - PROBLEM SELECTOR PLAN 2
COVID PCR positive on 10/3  - On BiPAP @ 10/5, 40% FiO2 but transitioning to high flow as tolerated  -continue remdesivir (10/3-7) and decadron (10/3-)  - Monitor daily renal and hepatic function while on Remdesivir   - Check COVID antibodies  - Lovenox for DVT ppx- BMI 28  - Trend biomarkers COVID PCR positive on 10/3  - transitioning to high flow as tolerated  -continue remdesivir (10/3-7) and decadron (10/3-)  - Monitor daily renal and hepatic function while on Remdesivir   - Check COVID antibodies  - Lovenox for DVT ppx- BMI 28  - Trend biomarkers

## 2020-10-07 NOTE — PROGRESS NOTE ADULT - PROBLEM SELECTOR PLAN 1
Likely 2/2 COVID, currently on BiPAP  -continue remdesivir (10/3-7) and decadron (10/3-)  -monitor cont. pulse ox. wean off bipap as tolerated.  -albuterol as needed Likely 2/2 COVID, currently on HFNC   -continue remdesivir (10/3-7) and decadron (10/3-)  -monitor cont. pulse ox. wean off HFNC as tolerated  -albuterol as needed

## 2020-10-07 NOTE — PROGRESS NOTE ADULT - SUBJECTIVE AND OBJECTIVE BOX
[facility] Medicine  Tosha Pillai MD  Pager (M-F, 8A-5P): [phone]  Other Times:  [phone]    Patient is a 55y old  Female who presents with a chief complaint of SOB (06 Oct 2020 16:42)      SUBJECTIVE / OVERNIGHT EVENTS:      MEDICATIONS  (STANDING):  ALBUTerol    90 MICROgram(s) HFA Inhaler 1 Puff(s) Inhalation every 4 hours  dexAMETHasone  Injectable 6 milliGRAM(s) IV Push daily  dextrose 5%. 1000 milliLiter(s) (50 mL/Hr) IV Continuous <Continuous>  dextrose 50% Injectable 12.5 Gram(s) IV Push once  dextrose 50% Injectable 25 Gram(s) IV Push once  dextrose 50% Injectable 25 Gram(s) IV Push once  enoxaparin Injectable 40 milliGRAM(s) SubCutaneous daily  insulin lispro (HumaLOG) corrective regimen sliding scale   SubCutaneous three times a day before meals  insulin lispro (HumaLOG) corrective regimen sliding scale   SubCutaneous at bedtime  remdesivir  IVPB 100 milliGRAM(s) IV Intermittent every 24 hours  remdesivir  IVPB   IV Intermittent     MEDICATIONS  (PRN):  acetaminophen   Tablet .. 650 milliGRAM(s) Oral every 6 hours PRN Temp greater or equal to 38C (100.4F)  dextrose 40% Gel 15 Gram(s) Oral once PRN Blood Glucose LESS THAN 70 milliGRAM(s)/deciliter  glucagon  Injectable 1 milliGRAM(s) IntraMuscular once PRN Glucose LESS THAN 70 milligrams/deciliter      CAPILLARY BLOOD GLUCOSE      POCT Blood Glucose.: 123 mg/dL (07 Oct 2020 07:23)  POCT Blood Glucose.: 136 mg/dL (06 Oct 2020 23:01)  POCT Blood Glucose.: 126 mg/dL (06 Oct 2020 17:09)    I&O's Summary    06 Oct 2020 07:01  -  07 Oct 2020 07:00  --------------------------------------------------------  IN: 1040 mL / OUT: 0 mL / NET: 1040 mL        PHYSICAL EXAM:  Vital Signs Last 24 Hrs  T(C): 36.5 (07 Oct 2020 08:54), Max: 36.9 (06 Oct 2020 14:30)  T(F): 97.7 (07 Oct 2020 08:54), Max: 98.4 (06 Oct 2020 14:30)  HR: 69 (07 Oct 2020 08:54) (54 - 84)  BP: 110/59 (07 Oct 2020 08:54) (110/59 - 126/67)  BP(mean): --  RR: 18 (07 Oct 2020 08:54) (16 - 22)  SpO2: 96% (07 Oct 2020 08:54) (89% - 97%)    CONSTITUTIONAL: NAD, well-developed, well-groomed  EYES: EOMI; conjunctiva and sclera clear  ENMT: Moist oral mucosa  RESPIRATORY: Normal respiratory effort; lungs are clear to auscultation bilaterally  CARDIOVASCULAR: Regular rate and rhythm, normal S1 and S2, no murmur; No lower extremity edema  ABDOMEN: Nontender to palpation, normoactive bowel sounds, no rebound/guarding  MUSCULOSKELETAL:   no clubbing or cyanosis of digits; no joint swelling or tenderness to palpation  PSYCH: A+O to person, place, and time; affect appropriate  NEUROLOGY: CN 2-12 are intact and symmetric; no gross sensory deficits   SKIN: No rashes; no palpable lesions    LABS:                        12.3   9.31  )-----------( 417      ( 06 Oct 2020 06:50 )             37.8     10-07    139  |  104  |  22  ----------------------------<  114<H>  4.4   |  23  |  0.48<L>    Ca    8.9      07 Oct 2020 06:50  Phos  3.7     10-07  Mg     2.2     10-07    TPro  6.7  /  Alb  3.2<L>  /  TBili  0.2  /  DBili  x   /  AST  26  /  ALT  21  /  AlkPhos  85  10-07              RADIOLOGY & ADDITIONAL TESTS:  Results Reviewed:   Imaging Personally Reviewed:  Electrocardiogram Personally Reviewed:    COORDINATION OF CARE:  Care Discussed with Consultants/Other Providers [Y/N]: Y  Prior or Outpatient Records Reviewed [Y/N]: Y   [facility]  Tosha Pillai MD  Pager (M-F, 8A-5P): [phone]  Other Times:  [phone]    Patient is a 55y old  Female who presents with a chief complaint of SOB (06 Oct 2020 16:42)      SUBJECTIVE / OVERNIGHT EVENTS:  pt on HFNC - she is beginning to feel like breathing is a bit easier - she continues to desat when eating or talking    MEDICATIONS  (STANDING):  ALBUTerol    90 MICROgram(s) HFA Inhaler 1 Puff(s) Inhalation every 4 hours  dexAMETHasone  Injectable 6 milliGRAM(s) IV Push daily  dextrose 5%. 1000 milliLiter(s) (50 mL/Hr) IV Continuous <Continuous>  dextrose 50% Injectable 12.5 Gram(s) IV Push once  dextrose 50% Injectable 25 Gram(s) IV Push once  dextrose 50% Injectable 25 Gram(s) IV Push once  enoxaparin Injectable 40 milliGRAM(s) SubCutaneous daily  insulin lispro (HumaLOG) corrective regimen sliding scale   SubCutaneous three times a day before meals  insulin lispro (HumaLOG) corrective regimen sliding scale   SubCutaneous at bedtime  remdesivir  IVPB 100 milliGRAM(s) IV Intermittent every 24 hours  remdesivir  IVPB   IV Intermittent     MEDICATIONS  (PRN):  acetaminophen   Tablet .. 650 milliGRAM(s) Oral every 6 hours PRN Temp greater or equal to 38C (100.4F)  dextrose 40% Gel 15 Gram(s) Oral once PRN Blood Glucose LESS THAN 70 milliGRAM(s)/deciliter  glucagon  Injectable 1 milliGRAM(s) IntraMuscular once PRN Glucose LESS THAN 70 milligrams/deciliter      CAPILLARY BLOOD GLUCOSE      POCT Blood Glucose.: 123 mg/dL (07 Oct 2020 07:23)  POCT Blood Glucose.: 136 mg/dL (06 Oct 2020 23:01)  POCT Blood Glucose.: 126 mg/dL (06 Oct 2020 17:09)    I&O's Summary    06 Oct 2020 07:01  -  07 Oct 2020 07:00  --------------------------------------------------------  IN: 1040 mL / OUT: 0 mL / NET: 1040 mL        PHYSICAL EXAM:  Vital Signs Last 24 Hrs  T(C): 36.5 (07 Oct 2020 08:54), Max: 36.9 (06 Oct 2020 14:30)  T(F): 97.7 (07 Oct 2020 08:54), Max: 98.4 (06 Oct 2020 14:30)  HR: 69 (07 Oct 2020 08:54) (54 - 84)  BP: 110/59 (07 Oct 2020 08:54) (110/59 - 126/67)  BP(mean): --  RR: 18 (07 Oct 2020 08:54) (16 - 22)  SpO2: 96% (07 Oct 2020 08:54) (89% - 97%)    CONSTITUTIONAL: NAD, well-developed, well-groomed  EYES: EOMI; conjunctiva and sclera clear  ENMT: Moist oral mucosa; + HFNC  RESPIRATORY: Normal respiratory effort; lungs are clear to auscultation bilaterally  CARDIOVASCULAR: Regular rate and rhythm, normal S1 and S2, no murmur; No lower extremity edema  ABDOMEN: Nontender to palpation, normoactive bowel sounds, no rebound/guarding  MUSCULOSKELETAL:   no clubbing or cyanosis of digits; no joint swelling or tenderness to palpation  PSYCH: A+O to person, place, and time; affect appropriate  NEUROLOGY: CN 2-12 are intact and symmetric; no gross sensory deficits   SKIN: No rashes; no palpable lesions    LABS:                        12.3   9.31  )-----------( 417      ( 06 Oct 2020 06:50 )             37.8     10-07    139  |  104  |  22  ----------------------------<  114<H>  4.4   |  23  |  0.48<L>    Ca    8.9      07 Oct 2020 06:50  Phos  3.7     10-07  Mg     2.2     10-07    TPro  6.7  /  Alb  3.2<L>  /  TBili  0.2  /  DBili  x   /  AST  26  /  ALT  21  /  AlkPhos  85  10-07              RADIOLOGY & ADDITIONAL TESTS:  Results Reviewed:   Imaging Personally Reviewed:  Electrocardiogram Personally Reviewed:    COORDINATION OF CARE:  Care Discussed with Consultants/Other Providers [Y/N]: Y  Prior or Outpatient Records Reviewed [Y/N]: Y

## 2020-10-07 NOTE — PROGRESS NOTE ADULT - ASSESSMENT
54 yo F without PMH presents to ED with 5 days of fever, cough, shortness of breath found hypoxic in ED requiring bipap and with +COVID-19 to be admitted for further management. 56 yo F without PMH presents to ED with 5 days of fever, cough, shortness of breath found hypoxic in ED requiring bipap and with +COVID-19

## 2020-10-08 LAB
ALBUMIN SERPL ELPH-MCNC: 3.2 G/DL — LOW (ref 3.3–5)
ALBUMIN SERPL ELPH-MCNC: 3.3 G/DL — SIGNIFICANT CHANGE UP (ref 3.3–5)
ALP SERPL-CCNC: 84 U/L — SIGNIFICANT CHANGE UP (ref 40–120)
ALP SERPL-CCNC: 92 U/L — SIGNIFICANT CHANGE UP (ref 40–120)
ALT FLD-CCNC: 17 U/L — SIGNIFICANT CHANGE UP (ref 4–33)
ALT FLD-CCNC: 18 U/L — SIGNIFICANT CHANGE UP (ref 4–33)
ANION GAP SERPL CALC-SCNC: 10 MMO/L — SIGNIFICANT CHANGE UP (ref 7–14)
AST SERPL-CCNC: 14 U/L — SIGNIFICANT CHANGE UP (ref 4–32)
AST SERPL-CCNC: 16 U/L — SIGNIFICANT CHANGE UP (ref 4–32)
BASOPHILS # BLD AUTO: 0.01 K/UL — SIGNIFICANT CHANGE UP (ref 0–0.2)
BASOPHILS NFR BLD AUTO: 0.1 % — SIGNIFICANT CHANGE UP (ref 0–2)
BILIRUB DIRECT SERPL-MCNC: < 0.2 MG/DL — SIGNIFICANT CHANGE UP (ref 0.1–0.2)
BILIRUB SERPL-MCNC: 0.3 MG/DL — SIGNIFICANT CHANGE UP (ref 0.2–1.2)
BILIRUB SERPL-MCNC: 0.3 MG/DL — SIGNIFICANT CHANGE UP (ref 0.2–1.2)
BUN SERPL-MCNC: 18 MG/DL — SIGNIFICANT CHANGE UP (ref 7–23)
CALCIUM SERPL-MCNC: 8.4 MG/DL — SIGNIFICANT CHANGE UP (ref 8.4–10.5)
CHLORIDE SERPL-SCNC: 102 MMOL/L — SIGNIFICANT CHANGE UP (ref 98–107)
CO2 SERPL-SCNC: 24 MMOL/L — SIGNIFICANT CHANGE UP (ref 22–31)
CREAT SERPL-MCNC: 0.51 MG/DL — SIGNIFICANT CHANGE UP (ref 0.5–1.3)
CREAT SERPL-MCNC: 0.73 MG/DL — SIGNIFICANT CHANGE UP (ref 0.5–1.3)
CULTURE RESULTS: SIGNIFICANT CHANGE UP
CULTURE RESULTS: SIGNIFICANT CHANGE UP
EOSINOPHIL # BLD AUTO: 0 K/UL — SIGNIFICANT CHANGE UP (ref 0–0.5)
EOSINOPHIL NFR BLD AUTO: 0 % — SIGNIFICANT CHANGE UP (ref 0–6)
GLUCOSE BLDC GLUCOMTR-MCNC: 111 MG/DL — HIGH (ref 70–99)
GLUCOSE BLDC GLUCOMTR-MCNC: 128 MG/DL — HIGH (ref 70–99)
GLUCOSE BLDC GLUCOMTR-MCNC: 155 MG/DL — HIGH (ref 70–99)
GLUCOSE BLDC GLUCOMTR-MCNC: 240 MG/DL — HIGH (ref 70–99)
GLUCOSE SERPL-MCNC: 98 MG/DL — SIGNIFICANT CHANGE UP (ref 70–99)
HCT VFR BLD CALC: 40.2 % — SIGNIFICANT CHANGE UP (ref 34.5–45)
HGB BLD-MCNC: 13.1 G/DL — SIGNIFICANT CHANGE UP (ref 11.5–15.5)
IMM GRANULOCYTES NFR BLD AUTO: 1.5 % — SIGNIFICANT CHANGE UP (ref 0–1.5)
LYMPHOCYTES # BLD AUTO: 1.16 K/UL — SIGNIFICANT CHANGE UP (ref 1–3.3)
LYMPHOCYTES # BLD AUTO: 9.2 % — LOW (ref 13–44)
MAGNESIUM SERPL-MCNC: 2 MG/DL — SIGNIFICANT CHANGE UP (ref 1.6–2.6)
MCHC RBC-ENTMCNC: 28.6 PG — SIGNIFICANT CHANGE UP (ref 27–34)
MCHC RBC-ENTMCNC: 32.6 % — SIGNIFICANT CHANGE UP (ref 32–36)
MCV RBC AUTO: 87.8 FL — SIGNIFICANT CHANGE UP (ref 80–100)
MONOCYTES # BLD AUTO: 0.87 K/UL — SIGNIFICANT CHANGE UP (ref 0–0.9)
MONOCYTES NFR BLD AUTO: 6.9 % — SIGNIFICANT CHANGE UP (ref 2–14)
NEUTROPHILS # BLD AUTO: 10.33 K/UL — HIGH (ref 1.8–7.4)
NEUTROPHILS NFR BLD AUTO: 82.3 % — HIGH (ref 43–77)
NRBC # FLD: 0 K/UL — SIGNIFICANT CHANGE UP (ref 0–0)
PHOSPHATE SERPL-MCNC: 3.2 MG/DL — SIGNIFICANT CHANGE UP (ref 2.5–4.5)
PLATELET # BLD AUTO: 508 K/UL — HIGH (ref 150–400)
PMV BLD: 8.9 FL — SIGNIFICANT CHANGE UP (ref 7–13)
POTASSIUM SERPL-MCNC: 4.1 MMOL/L — SIGNIFICANT CHANGE UP (ref 3.5–5.3)
POTASSIUM SERPL-SCNC: 4.1 MMOL/L — SIGNIFICANT CHANGE UP (ref 3.5–5.3)
PROT SERPL-MCNC: 6.6 G/DL — SIGNIFICANT CHANGE UP (ref 6–8.3)
PROT SERPL-MCNC: 6.8 G/DL — SIGNIFICANT CHANGE UP (ref 6–8.3)
RBC # BLD: 4.58 M/UL — SIGNIFICANT CHANGE UP (ref 3.8–5.2)
RBC # FLD: 13.4 % — SIGNIFICANT CHANGE UP (ref 10.3–14.5)
SARS-COV-2 IGG SERPL QL IA: POSITIVE
SARS-COV-2 IGM SERPL IA-ACNC: 4.82 INDEX — HIGH
SODIUM SERPL-SCNC: 136 MMOL/L — SIGNIFICANT CHANGE UP (ref 135–145)
SPECIMEN SOURCE: SIGNIFICANT CHANGE UP
SPECIMEN SOURCE: SIGNIFICANT CHANGE UP
WBC # BLD: 12.56 K/UL — HIGH (ref 3.8–10.5)
WBC # FLD AUTO: 12.56 K/UL — HIGH (ref 3.8–10.5)

## 2020-10-08 PROCEDURE — 99233 SBSQ HOSP IP/OBS HIGH 50: CPT

## 2020-10-08 RX ORDER — REMDESIVIR 5 MG/ML
100 INJECTION INTRAVENOUS EVERY 24 HOURS
Refills: 0 | Status: COMPLETED | OUTPATIENT
Start: 2020-10-08 | End: 2020-10-12

## 2020-10-08 RX ADMIN — ALBUTEROL 1 PUFF(S): 90 AEROSOL, METERED ORAL at 12:30

## 2020-10-08 RX ADMIN — Medication 1: at 12:03

## 2020-10-08 RX ADMIN — ALBUTEROL 1 PUFF(S): 90 AEROSOL, METERED ORAL at 00:45

## 2020-10-08 RX ADMIN — ALBUTEROL 1 PUFF(S): 90 AEROSOL, METERED ORAL at 22:00

## 2020-10-08 RX ADMIN — ALBUTEROL 1 PUFF(S): 90 AEROSOL, METERED ORAL at 09:00

## 2020-10-08 RX ADMIN — REMDESIVIR 500 MILLIGRAM(S): 5 INJECTION INTRAVENOUS at 23:34

## 2020-10-08 RX ADMIN — ENOXAPARIN SODIUM 40 MILLIGRAM(S): 100 INJECTION SUBCUTANEOUS at 13:45

## 2020-10-08 RX ADMIN — Medication 6 MILLIGRAM(S): at 05:30

## 2020-10-08 RX ADMIN — ALBUTEROL 1 PUFF(S): 90 AEROSOL, METERED ORAL at 18:00

## 2020-10-08 RX ADMIN — ALBUTEROL 1 PUFF(S): 90 AEROSOL, METERED ORAL at 05:30

## 2020-10-08 NOTE — PROGRESS NOTE ADULT - PROBLEM SELECTOR PROBLEM 2
2019 novel coronavirus disease (COVID-19)

## 2020-10-08 NOTE — PROGRESS NOTE ADULT - PROBLEM SELECTOR PLAN 3
- DVT: lovenox
- leuokocytosis due to steroids  - Procalcitonin mildly elevated, low suspicion for superimposed bacterial PNA  - Continue to trend/monitor for fevers
WBC 18 today, likely elevated due to steroid use  - Procalcitonin mildly elevated, low suspicion for superimposed bacterial PNA  - Continue to trend/monitor for fevers
WBC 18 today, likely elevated due to steroid use  - Procalcitonin mildly elevated, low suspicion for superimposed bacterial PNA  - Continue to trend/monitor for fevers

## 2020-10-08 NOTE — PROGRESS NOTE ADULT - PROBLEM SELECTOR PROBLEM 4
Discharge planning issues
Prophylactic measure

## 2020-10-08 NOTE — PROGRESS NOTE ADULT - ASSESSMENT
56 yo F without PMH presents to ED with 5 days of fever, cough, shortness of breath found hypoxic in ED requiring bipap and with +COVID-19

## 2020-10-08 NOTE — PROGRESS NOTE ADULT - SUBJECTIVE AND OBJECTIVE BOX
[facility]  Tosha Pillai MD  Pager (M-F, 8A-5P): [phone]  Other Times:  [phone]    Patient is a 55y old  Female who presents with a chief complaint of SOB (07 Oct 2020 09:58)    SUBJECTIVE / OVERNIGHT EVENTS:  Pt on HFNC - desats slightly when eating otherwise feeling well     MEDICATIONS  (STANDING):  ALBUTerol    90 MICROgram(s) HFA Inhaler 1 Puff(s) Inhalation every 4 hours  dexAMETHasone  Injectable 6 milliGRAM(s) IV Push daily  dextrose 5%. 1000 milliLiter(s) (50 mL/Hr) IV Continuous <Continuous>  dextrose 50% Injectable 12.5 Gram(s) IV Push once  dextrose 50% Injectable 25 Gram(s) IV Push once  dextrose 50% Injectable 25 Gram(s) IV Push once  enoxaparin Injectable 40 milliGRAM(s) SubCutaneous daily  insulin lispro (HumaLOG) corrective regimen sliding scale   SubCutaneous three times a day before meals  insulin lispro (HumaLOG) corrective regimen sliding scale   SubCutaneous at bedtime  remdesivir  IVPB 100 milliGRAM(s) IV Intermittent every 24 hours    MEDICATIONS  (PRN):  acetaminophen   Tablet .. 650 milliGRAM(s) Oral every 6 hours PRN Temp greater or equal to 38C (100.4F)  dextrose 40% Gel 15 Gram(s) Oral once PRN Blood Glucose LESS THAN 70 milliGRAM(s)/deciliter  glucagon  Injectable 1 milliGRAM(s) IntraMuscular once PRN Glucose LESS THAN 70 milligrams/deciliter      CAPILLARY BLOOD GLUCOSE      POCT Blood Glucose.: 155 mg/dL (08 Oct 2020 12:03)  POCT Blood Glucose.: 240 mg/dL (08 Oct 2020 08:49)  POCT Blood Glucose.: 124 mg/dL (07 Oct 2020 21:22)  POCT Blood Glucose.: 120 mg/dL (07 Oct 2020 17:13)    I&O's Summary    07 Oct 2020 07:01  -  08 Oct 2020 07:00  --------------------------------------------------------  IN: 0 mL / OUT: 1000 mL / NET: -1000 mL    08 Oct 2020 07:01  -  08 Oct 2020 14:16  --------------------------------------------------------  IN: 500 mL / OUT: 0 mL / NET: 500 mL        PHYSICAL EXAM:  Vital Signs Last 24 Hrs  T(C): 36.4 (08 Oct 2020 10:00), Max: 36.7 (07 Oct 2020 15:34)  T(F): 97.5 (08 Oct 2020 10:00), Max: 98 (07 Oct 2020 15:34)  HR: 76 (08 Oct 2020 10:00) (56 - 76)  BP: 110/63 (08 Oct 2020 10:00) (99/69 - 127/63)  BP(mean): --  RR: 17 (08 Oct 2020 10:21) (16 - 20)  SpO2: 95% (08 Oct 2020 10:21) (92% - 98%)    CONSTITUTIONAL: NAD, well-developed, well-groomed  EYES: EOMI; conjunctiva and sclera clear  ENMT: Moist oral mucosa; + HFNC  RESPIRATORY: Normal respiratory effort; lungs are clear to auscultation bilaterally  CARDIOVASCULAR: Regular rate and rhythm, normal S1 and S2, no murmur; No lower extremity edema  ABDOMEN: Nontender to palpation, normoactive bowel sounds, no rebound/guarding  MUSCULOSKELETAL:   no clubbing or cyanosis of digits; no joint swelling or tenderness to palpation  PSYCH: A+O to person, place, and time; affect appropriate  NEUROLOGY: CN 2-12 are intact and symmetric; no gross sensory deficits   SKIN: No rashes; no palpable lesions    LABS:                        13.1   12.56 )-----------( 508      ( 08 Oct 2020 05:45 )             40.2     10-08    136  |  102  |  18  ----------------------------<  98  4.1   |  24  |  0.51    Ca    8.4      08 Oct 2020 05:45  Phos  3.2     10-08  Mg     2.0     10-08    TPro  6.6  /  Alb  3.2<L>  /  TBili  0.3  /  DBili  x   /  AST  16  /  ALT  17  /  AlkPhos  84  10-08    RADIOLOGY & ADDITIONAL TESTS:  Results Reviewed:   Imaging Personally Reviewed:  Electrocardiogram Personally Reviewed:    COORDINATION OF CARE:  Care Discussed with Consultants/Other Providers [Y/N]: Y  Prior or Outpatient Records Reviewed [Y/N]: Y

## 2020-10-08 NOTE — PROGRESS NOTE ADULT - PROBLEM SELECTOR PROBLEM 3
Prophylactic measure
Leukocytosis, unspecified type

## 2020-10-08 NOTE — PROGRESS NOTE ADULT - PROBLEM SELECTOR PLAN 1
Likely 2/2 COVID, currently on HFNC   -continue remdesivir for extended course (10/3-12) and decadron (10/3-)  -monitor cont. pulse ox. wean off HFNC as tolerated  -albuterol as needed

## 2020-10-08 NOTE — CHART NOTE - NSCHARTNOTEFT_GEN_A_CORE
Writer called by primary nurse. Patient complaining of too much pressure from high flow. Chart reviewed. Patient settings recently increased from 40 L/min to 50L/min. Patient is sating 100% on current setting. Will adjust settings.

## 2020-10-08 NOTE — PROGRESS NOTE ADULT - PROBLEM SELECTOR PLAN 2
COVID PCR positive on 10/3  - transitioning to high flow as tolerated  -continue remdesivir (10/3-12) and decadron (10/3-)  - Monitor daily renal and hepatic function while on Remdesivir   - Check COVID antibodies  - Lovenox for DVT ppx- BMI 28  - Trend biomarkers

## 2020-10-08 NOTE — PROGRESS NOTE ADULT - PROBLEM SELECTOR PROBLEM 1
Acute hypoxemic respiratory failure

## 2020-10-09 LAB
ALBUMIN SERPL ELPH-MCNC: 3.1 G/DL — LOW (ref 3.3–5)
ALBUMIN SERPL ELPH-MCNC: 3.2 G/DL — LOW (ref 3.3–5)
ALP SERPL-CCNC: 94 U/L — SIGNIFICANT CHANGE UP (ref 40–120)
ALP SERPL-CCNC: 94 U/L — SIGNIFICANT CHANGE UP (ref 40–120)
ALT FLD-CCNC: 14 U/L — SIGNIFICANT CHANGE UP (ref 4–33)
ALT FLD-CCNC: 15 U/L — SIGNIFICANT CHANGE UP (ref 4–33)
ANION GAP SERPL CALC-SCNC: 12 MMO/L — SIGNIFICANT CHANGE UP (ref 7–14)
AST SERPL-CCNC: 15 U/L — SIGNIFICANT CHANGE UP (ref 4–32)
AST SERPL-CCNC: 15 U/L — SIGNIFICANT CHANGE UP (ref 4–32)
BILIRUB DIRECT SERPL-MCNC: < 0.2 MG/DL — SIGNIFICANT CHANGE UP (ref 0.1–0.2)
BILIRUB SERPL-MCNC: 0.3 MG/DL — SIGNIFICANT CHANGE UP (ref 0.2–1.2)
BILIRUB SERPL-MCNC: 0.3 MG/DL — SIGNIFICANT CHANGE UP (ref 0.2–1.2)
BUN SERPL-MCNC: 18 MG/DL — SIGNIFICANT CHANGE UP (ref 7–23)
CALCIUM SERPL-MCNC: 9.1 MG/DL — SIGNIFICANT CHANGE UP (ref 8.4–10.5)
CHLORIDE SERPL-SCNC: 100 MMOL/L — SIGNIFICANT CHANGE UP (ref 98–107)
CO2 SERPL-SCNC: 25 MMOL/L — SIGNIFICANT CHANGE UP (ref 22–31)
CREAT SERPL-MCNC: 0.55 MG/DL — SIGNIFICANT CHANGE UP (ref 0.5–1.3)
CREAT SERPL-MCNC: 0.57 MG/DL — SIGNIFICANT CHANGE UP (ref 0.5–1.3)
GLUCOSE BLDC GLUCOMTR-MCNC: 138 MG/DL — HIGH (ref 70–99)
GLUCOSE BLDC GLUCOMTR-MCNC: 139 MG/DL — HIGH (ref 70–99)
GLUCOSE BLDC GLUCOMTR-MCNC: 217 MG/DL — HIGH (ref 70–99)
GLUCOSE BLDC GLUCOMTR-MCNC: 88 MG/DL — SIGNIFICANT CHANGE UP (ref 70–99)
GLUCOSE SERPL-MCNC: 92 MG/DL — SIGNIFICANT CHANGE UP (ref 70–99)
HCT VFR BLD CALC: 43.7 % — SIGNIFICANT CHANGE UP (ref 34.5–45)
HGB BLD-MCNC: 13.9 G/DL — SIGNIFICANT CHANGE UP (ref 11.5–15.5)
MAGNESIUM SERPL-MCNC: 2.2 MG/DL — SIGNIFICANT CHANGE UP (ref 1.6–2.6)
MCHC RBC-ENTMCNC: 28.5 PG — SIGNIFICANT CHANGE UP (ref 27–34)
MCHC RBC-ENTMCNC: 31.8 % — LOW (ref 32–36)
MCV RBC AUTO: 89.5 FL — SIGNIFICANT CHANGE UP (ref 80–100)
NRBC # FLD: 0 K/UL — SIGNIFICANT CHANGE UP (ref 0–0)
PHOSPHATE SERPL-MCNC: 3.5 MG/DL — SIGNIFICANT CHANGE UP (ref 2.5–4.5)
PLATELET # BLD AUTO: 548 K/UL — HIGH (ref 150–400)
PMV BLD: 8.7 FL — SIGNIFICANT CHANGE UP (ref 7–13)
POTASSIUM SERPL-MCNC: 4.6 MMOL/L — SIGNIFICANT CHANGE UP (ref 3.5–5.3)
POTASSIUM SERPL-SCNC: 4.6 MMOL/L — SIGNIFICANT CHANGE UP (ref 3.5–5.3)
PROT SERPL-MCNC: 6.6 G/DL — SIGNIFICANT CHANGE UP (ref 6–8.3)
PROT SERPL-MCNC: 6.7 G/DL — SIGNIFICANT CHANGE UP (ref 6–8.3)
RBC # BLD: 4.88 M/UL — SIGNIFICANT CHANGE UP (ref 3.8–5.2)
RBC # FLD: 13.2 % — SIGNIFICANT CHANGE UP (ref 10.3–14.5)
SODIUM SERPL-SCNC: 137 MMOL/L — SIGNIFICANT CHANGE UP (ref 135–145)
WBC # BLD: 10.85 K/UL — HIGH (ref 3.8–10.5)
WBC # FLD AUTO: 10.85 K/UL — HIGH (ref 3.8–10.5)

## 2020-10-09 RX ADMIN — ALBUTEROL 1 PUFF(S): 90 AEROSOL, METERED ORAL at 10:14

## 2020-10-09 RX ADMIN — ALBUTEROL 1 PUFF(S): 90 AEROSOL, METERED ORAL at 18:38

## 2020-10-09 RX ADMIN — ALBUTEROL 1 PUFF(S): 90 AEROSOL, METERED ORAL at 06:37

## 2020-10-09 RX ADMIN — Medication 6 MILLIGRAM(S): at 06:37

## 2020-10-09 RX ADMIN — ENOXAPARIN SODIUM 40 MILLIGRAM(S): 100 INJECTION SUBCUTANEOUS at 11:56

## 2020-10-09 RX ADMIN — ALBUTEROL 1 PUFF(S): 90 AEROSOL, METERED ORAL at 23:39

## 2020-10-09 RX ADMIN — Medication 2: at 11:55

## 2020-10-09 RX ADMIN — REMDESIVIR 500 MILLIGRAM(S): 5 INJECTION INTRAVENOUS at 23:39

## 2020-10-09 RX ADMIN — ALBUTEROL 1 PUFF(S): 90 AEROSOL, METERED ORAL at 15:10

## 2020-10-09 NOTE — OB NEONATOLOGY/PEDIATRICIAN DELIVERY SUMMARY - NSPEDSNEONOTESB_OBGYN_ALL_OB_FT
38.4 wk  male born via CS to a 32 y/o  blood type O+ mother. Maternal history of breast augmentation. Prenatal history of HSV on valtrex.  PNL -/-/NR/I, GBS - on . ROM at time of delivery with  meconium fluids. Baby emerged vigorous, crying, was w/d/s/s with APGARS of 9/9. Mom plans to initiate breastfeeding, consents Hep B vaccine and declines circ.

## 2020-10-09 NOTE — OB NEONATOLOGY/PEDIATRICIAN DELIVERY SUMMARY - NSPEDSNEONOTESA_OBGYN_ALL_OB_FT
38.4 wk  male born via CS breech to a 30 y/o  blood type O+ mother. Maternal history of breast augmentation. Prenatal history of HSV on valtrex.  PNL -/-/NR/I, GBS - on . ROM at time of delivery with  meconium fluids. Baby emerged vigorous, crying, was w/d/s/s with APGARS of 8/9. Mom plans to initiate breastfeeding, consents Hep B vaccine and declines circ.

## 2020-10-09 NOTE — PROGRESS NOTE ADULT - SUBJECTIVE AND OBJECTIVE BOX
Patient is a 55y old  Female who presents with a chief complaint of SOB (08 Oct 2020 14:16)      INTERVAL HPI/OVERNIGHT EVENTS:  T(C): 36.6 (10-09-20 @ 12:44), Max: 36.7 (10-09-20 @ 02:15)  HR: 86 (10-09-20 @ 15:36) (57 - 86)  BP: 120/71 (10-09-20 @ 12:44) (100/60 - 120/71)  RR: 22 (10-09-20 @ 15:36) (18 - 22)  SpO2: 92% (10-09-20 @ 15:36) (92% - 98%)  Wt(kg): --  I&O's Summary    08 Oct 2020 07:01  -  09 Oct 2020 07:00  --------------------------------------------------------  IN: 750 mL / OUT: 1500 mL / NET: -750 mL    09 Oct 2020 07:01  -  09 Oct 2020 17:06  --------------------------------------------------------  IN: 0 mL / OUT: 1000 mL / NET: -1000 mL        LABS:                        13.9   10.85 )-----------( 548      ( 09 Oct 2020 06:20 )             43.7     10-09    137  |  100  |  18  ----------------------------<  92  4.6   |  25  |  0.57    Ca    9.1      09 Oct 2020 06:20  Phos  3.5     10-09  Mg     2.2     10-09    TPro  6.6  /  Alb  3.1<L>  /  TBili  0.3  /  DBili  < 0.2  /  AST  15  /  ALT  14  /  AlkPhos  94  10-09        CAPILLARY BLOOD GLUCOSE      POCT Blood Glucose.: 138 mg/dL (09 Oct 2020 16:36)  POCT Blood Glucose.: 217 mg/dL (09 Oct 2020 11:37)  POCT Blood Glucose.: 88 mg/dL (09 Oct 2020 07:22)  POCT Blood Glucose.: 111 mg/dL (08 Oct 2020 22:35)            MEDICATIONS  (STANDING):  ALBUTerol    90 MICROgram(s) HFA Inhaler 1 Puff(s) Inhalation every 4 hours  dexAMETHasone  Injectable 6 milliGRAM(s) IV Push daily  dextrose 5%. 1000 milliLiter(s) (50 mL/Hr) IV Continuous <Continuous>  dextrose 50% Injectable 12.5 Gram(s) IV Push once  dextrose 50% Injectable 25 Gram(s) IV Push once  dextrose 50% Injectable 25 Gram(s) IV Push once  enoxaparin Injectable 40 milliGRAM(s) SubCutaneous daily  insulin lispro (HumaLOG) corrective regimen sliding scale   SubCutaneous three times a day before meals  insulin lispro (HumaLOG) corrective regimen sliding scale   SubCutaneous at bedtime  remdesivir  IVPB 100 milliGRAM(s) IV Intermittent every 24 hours    MEDICATIONS  (PRN):  acetaminophen   Tablet .. 650 milliGRAM(s) Oral every 6 hours PRN Temp greater or equal to 38C (100.4F)  dextrose 40% Gel 15 Gram(s) Oral once PRN Blood Glucose LESS THAN 70 milliGRAM(s)/deciliter  glucagon  Injectable 1 milliGRAM(s) IntraMuscular once PRN Glucose LESS THAN 70 milligrams/deciliter          PHYSICAL EXAM:  GENERAL: NAD, well-groomed, well-developed  HEAD:  Atraumatic, Normocephalic  CHEST/LUNG: Clear to percussion bilaterally; No rales, rhonchi, wheezing, or rubs  HEART: Regular rate and rhythm; No murmurs, rubs, or gallops  ABDOMEN: Soft, Nontender, Nondistended; Bowel sounds present  EXTREMITIES:  2+ Peripheral Pulses, No clubbing, cyanosis, or edema  LYMPH: No lymphadenopathy noted  SKIN: No rashes or lesions    Care Discussed with Consultants/Other Providers [ ] YES  [ ] NO

## 2020-10-09 NOTE — PROGRESS NOTE ADULT - ASSESSMENT
Problem/Plan - 1:  ·  Problem: Acute hypoxemic respiratory failure.  Plan: Likely 2/2 COVID, currently on HFNC   -continue remdesivir (10/3-7) and decadron (10/3-)  -monitor cont. pulse ox. wean off HFNC as tolerated  -albuterol as needed.    Problem/Plan - 2:  ·  Problem: 2019 novel coronavirus disease (COVID-19).  Plan: COVID PCR positive on 10/3  - transitioning to high flow as tolerated  -continue remdesivir (10/3-7) and decadron (10/3-)  - Monitor daily renal and hepatic function while on Remdesivir   - Check COVID antibodies  - Lovenox for DVT ppx- BMI 28  - Trend biomarkers.    Problem/Plan - 3:·  Problem: Leukocytosis, unspecified type.  Plan: - leuokocytosis due to steroids  - Procalcitonin mildly elevated, low suspicion for superimposed bacterial PNA  - Continue to trend/monitor for fevers.     Problem/Plan - 4:  ·  Problem: Prophylactic measure.  Plan: - DVT: lovenox.

## 2020-10-10 LAB
ALBUMIN SERPL ELPH-MCNC: 3.1 G/DL — LOW (ref 3.3–5)
ALP SERPL-CCNC: 96 U/L — SIGNIFICANT CHANGE UP (ref 40–120)
ALT FLD-CCNC: 13 U/L — SIGNIFICANT CHANGE UP (ref 4–33)
ANION GAP SERPL CALC-SCNC: 10 MMO/L — SIGNIFICANT CHANGE UP (ref 7–14)
AST SERPL-CCNC: 16 U/L — SIGNIFICANT CHANGE UP (ref 4–32)
BILIRUB DIRECT SERPL-MCNC: < 0.2 MG/DL — SIGNIFICANT CHANGE UP (ref 0.1–0.2)
BILIRUB SERPL-MCNC: 0.3 MG/DL — SIGNIFICANT CHANGE UP (ref 0.2–1.2)
BUN SERPL-MCNC: 19 MG/DL — SIGNIFICANT CHANGE UP (ref 7–23)
CALCIUM SERPL-MCNC: 9.2 MG/DL — SIGNIFICANT CHANGE UP (ref 8.4–10.5)
CHLORIDE SERPL-SCNC: 101 MMOL/L — SIGNIFICANT CHANGE UP (ref 98–107)
CO2 SERPL-SCNC: 25 MMOL/L — SIGNIFICANT CHANGE UP (ref 22–31)
CREAT SERPL-MCNC: 0.55 MG/DL — SIGNIFICANT CHANGE UP (ref 0.5–1.3)
CREAT SERPL-MCNC: 0.55 MG/DL — SIGNIFICANT CHANGE UP (ref 0.5–1.3)
CRP SERPL-MCNC: 10.8 MG/L — HIGH
FERRITIN SERPL-MCNC: 165.7 NG/ML — HIGH (ref 15–150)
FIBRINOGEN PPP-MCNC: 700 MG/DL — HIGH (ref 290–520)
GLUCOSE BLDC GLUCOMTR-MCNC: 125 MG/DL — HIGH (ref 70–99)
GLUCOSE BLDC GLUCOMTR-MCNC: 131 MG/DL — HIGH (ref 70–99)
GLUCOSE BLDC GLUCOMTR-MCNC: 154 MG/DL — HIGH (ref 70–99)
GLUCOSE BLDC GLUCOMTR-MCNC: 95 MG/DL — SIGNIFICANT CHANGE UP (ref 70–99)
GLUCOSE SERPL-MCNC: 95 MG/DL — SIGNIFICANT CHANGE UP (ref 70–99)
HCT VFR BLD CALC: 43.5 % — SIGNIFICANT CHANGE UP (ref 34.5–45)
HGB BLD-MCNC: 13.9 G/DL — SIGNIFICANT CHANGE UP (ref 11.5–15.5)
LDH SERPL L TO P-CCNC: 271 U/L — HIGH (ref 135–225)
MAGNESIUM SERPL-MCNC: 2.2 MG/DL — SIGNIFICANT CHANGE UP (ref 1.6–2.6)
MCHC RBC-ENTMCNC: 27.7 PG — SIGNIFICANT CHANGE UP (ref 27–34)
MCHC RBC-ENTMCNC: 32 % — SIGNIFICANT CHANGE UP (ref 32–36)
MCV RBC AUTO: 86.7 FL — SIGNIFICANT CHANGE UP (ref 80–100)
NRBC # FLD: 0 K/UL — SIGNIFICANT CHANGE UP (ref 0–0)
PHOSPHATE SERPL-MCNC: 3.4 MG/DL — SIGNIFICANT CHANGE UP (ref 2.5–4.5)
PLATELET # BLD AUTO: 619 K/UL — HIGH (ref 150–400)
PMV BLD: 8.5 FL — SIGNIFICANT CHANGE UP (ref 7–13)
POTASSIUM SERPL-MCNC: 4.5 MMOL/L — SIGNIFICANT CHANGE UP (ref 3.5–5.3)
POTASSIUM SERPL-SCNC: 4.5 MMOL/L — SIGNIFICANT CHANGE UP (ref 3.5–5.3)
PROT SERPL-MCNC: 6.7 G/DL — SIGNIFICANT CHANGE UP (ref 6–8.3)
RBC # BLD: 5.02 M/UL — SIGNIFICANT CHANGE UP (ref 3.8–5.2)
RBC # FLD: 13.2 % — SIGNIFICANT CHANGE UP (ref 10.3–14.5)
SODIUM SERPL-SCNC: 136 MMOL/L — SIGNIFICANT CHANGE UP (ref 135–145)
WBC # BLD: 10.67 K/UL — HIGH (ref 3.8–10.5)
WBC # FLD AUTO: 10.67 K/UL — HIGH (ref 3.8–10.5)

## 2020-10-10 RX ADMIN — ALBUTEROL 1 PUFF(S): 90 AEROSOL, METERED ORAL at 10:25

## 2020-10-10 RX ADMIN — ALBUTEROL 1 PUFF(S): 90 AEROSOL, METERED ORAL at 14:29

## 2020-10-10 RX ADMIN — ALBUTEROL 1 PUFF(S): 90 AEROSOL, METERED ORAL at 21:01

## 2020-10-10 RX ADMIN — REMDESIVIR 500 MILLIGRAM(S): 5 INJECTION INTRAVENOUS at 22:39

## 2020-10-10 RX ADMIN — ALBUTEROL 1 PUFF(S): 90 AEROSOL, METERED ORAL at 18:45

## 2020-10-10 RX ADMIN — Medication 1: at 17:25

## 2020-10-10 RX ADMIN — Medication 6 MILLIGRAM(S): at 07:15

## 2020-10-10 RX ADMIN — ALBUTEROL 1 PUFF(S): 90 AEROSOL, METERED ORAL at 07:15

## 2020-10-10 RX ADMIN — ENOXAPARIN SODIUM 40 MILLIGRAM(S): 100 INJECTION SUBCUTANEOUS at 14:28

## 2020-10-10 NOTE — PROGRESS NOTE ADULT - SUBJECTIVE AND OBJECTIVE BOX
Patient is a 55y old  Female who presents with a chief complaint of SOB (09 Oct 2020 17:05)      INTERVAL HPI/OVERNIGHT EVENTS:  T(C): 36.8 (10-10-20 @ 17:03), Max: 36.8 (10-10-20 @ 14:29)  HR: 69 (10-10-20 @ 17:03) (51 - 82)  BP: 110/74 (10-10-20 @ 17:03) (103/70 - 115/72)  RR: 19 (10-10-20 @ 17:03) (15 - 21)  SpO2: 100% (10-10-20 @ 17:03) (95% - 100%)  Wt(kg): --  I&O's Summary    09 Oct 2020 07:01  -  10 Oct 2020 07:00  --------------------------------------------------------  IN: 250 mL / OUT: 1400 mL / NET: -1150 mL    10 Oct 2020 07:01  -  10 Oct 2020 19:15  --------------------------------------------------------  IN: 480 mL / OUT: 0 mL / NET: 480 mL        LABS:                        13.9   10.67 )-----------( 619      ( 10 Oct 2020 07:50 )             43.5     10-10    136  |  101  |  19  ----------------------------<  95  4.5   |  25  |  0.55    Ca    9.2      10 Oct 2020 07:50  Phos  3.4     10-10  Mg     2.2     10-10    TPro  6.7  /  Alb  3.1<L>  /  TBili  0.3  /  DBili  < 0.2  /  AST  16  /  ALT  13  /  AlkPhos  96  10-10        CAPILLARY BLOOD GLUCOSE      POCT Blood Glucose.: 154 mg/dL (10 Oct 2020 16:50)  POCT Blood Glucose.: 131 mg/dL (10 Oct 2020 11:56)  POCT Blood Glucose.: 95 mg/dL (10 Oct 2020 07:52)  POCT Blood Glucose.: 139 mg/dL (09 Oct 2020 22:13)            MEDICATIONS  (STANDING):  ALBUTerol    90 MICROgram(s) HFA Inhaler 1 Puff(s) Inhalation every 4 hours  dexAMETHasone  Injectable 6 milliGRAM(s) IV Push daily  dextrose 5%. 1000 milliLiter(s) (50 mL/Hr) IV Continuous <Continuous>  dextrose 50% Injectable 12.5 Gram(s) IV Push once  dextrose 50% Injectable 25 Gram(s) IV Push once  dextrose 50% Injectable 25 Gram(s) IV Push once  enoxaparin Injectable 40 milliGRAM(s) SubCutaneous daily  insulin lispro (HumaLOG) corrective regimen sliding scale   SubCutaneous three times a day before meals  insulin lispro (HumaLOG) corrective regimen sliding scale   SubCutaneous at bedtime  remdesivir  IVPB 100 milliGRAM(s) IV Intermittent every 24 hours    MEDICATIONS  (PRN):  acetaminophen   Tablet .. 650 milliGRAM(s) Oral every 6 hours PRN Temp greater or equal to 38C (100.4F)  dextrose 40% Gel 15 Gram(s) Oral once PRN Blood Glucose LESS THAN 70 milliGRAM(s)/deciliter  glucagon  Injectable 1 milliGRAM(s) IntraMuscular once PRN Glucose LESS THAN 70 milligrams/deciliter          PHYSICAL EXAM:  GENERAL: NAD, well-groomed, well-developed  HEAD:  Atraumatic, Normocephalic  CHEST/LUNG: Clear to percussion bilaterally; No rales, rhonchi, wheezing, or rubs  HEART: Regular rate and rhythm; No murmurs, rubs, or gallops  ABDOMEN: Soft, Nontender, Nondistended; Bowel sounds present  EXTREMITIES:  2+ Peripheral Pulses, No clubbing, cyanosis, or edema  LYMPH: No lymphadenopathy noted  SKIN: No rashes or lesions    Care Discussed with Consultants/Other Providers [ ] YES  [ ] NO

## 2020-10-10 NOTE — PROGRESS NOTE ADULT - ASSESSMENT
56 yo F without PMH presents to ED with 5 days of fever, cough, shortness of breath found hypoxic in ED requiring bipap and with +COVID-19    Problem/Plan - 1:  ·  Problem: Acute hypoxemic respiratory failure.  Plan: Likely 2/2 COVID, currently on HFNC   -continue remdesivir for extended course (10/3-12) and decadron (10/3-)  -monitor cont. pulse ox. wean off HFNC as tolerated  -albuterol as needed.     Problem/Plan - 2:  ·  Problem: 2019 novel coronavirus disease (COVID-19).  Plan: COVID PCR positive on 10/3  - transitioning to high flow as tolerated-continue remdesivir (10/3-12) and decadron (10/3-)  - Monitor daily renal and hepatic function while on Remdesivir   - Check COVID antibodies  - Lovenox for DVT ppx- BMI 28  - Trend biomarkers.     Problem/Plan - 3:  ·  Problem: Leukocytosis, unspecified type.  Plan: - leuokocytosis due to steroids  - Procalcitonin mildly elevated, low suspicion for superimposed bacterial PNA  - Continue to trend/monitor for fevers.     Problem/Plan - 4:  ·  Problem: Prophylactic measure.  Plan: - DVT: lovenox.

## 2020-10-11 LAB
ALBUMIN SERPL ELPH-MCNC: 3 G/DL — LOW (ref 3.3–5)
ALBUMIN SERPL ELPH-MCNC: 3.2 G/DL — LOW (ref 3.3–5)
ALP SERPL-CCNC: 91 U/L — SIGNIFICANT CHANGE UP (ref 40–120)
ALT FLD-CCNC: 10 U/L — SIGNIFICANT CHANGE UP (ref 4–33)
ANION GAP SERPL CALC-SCNC: 8 MMO/L — SIGNIFICANT CHANGE UP (ref 7–14)
AST SERPL-CCNC: 14 U/L — SIGNIFICANT CHANGE UP (ref 4–32)
BILIRUB DIRECT SERPL-MCNC: < 0.2 MG/DL — SIGNIFICANT CHANGE UP (ref 0.1–0.2)
BILIRUB SERPL-MCNC: 0.3 MG/DL — SIGNIFICANT CHANGE UP (ref 0.2–1.2)
BUN SERPL-MCNC: 21 MG/DL — SIGNIFICANT CHANGE UP (ref 7–23)
CALCIUM SERPL-MCNC: 8.4 MG/DL — SIGNIFICANT CHANGE UP (ref 8.4–10.5)
CHLORIDE SERPL-SCNC: 101 MMOL/L — SIGNIFICANT CHANGE UP (ref 98–107)
CO2 SERPL-SCNC: 25 MMOL/L — SIGNIFICANT CHANGE UP (ref 22–31)
CREAT SERPL-MCNC: 0.51 MG/DL — SIGNIFICANT CHANGE UP (ref 0.5–1.3)
CREAT SERPL-MCNC: 0.55 MG/DL — SIGNIFICANT CHANGE UP (ref 0.5–1.3)
GLUCOSE BLDC GLUCOMTR-MCNC: 105 MG/DL — HIGH (ref 70–99)
GLUCOSE BLDC GLUCOMTR-MCNC: 124 MG/DL — HIGH (ref 70–99)
GLUCOSE BLDC GLUCOMTR-MCNC: 126 MG/DL — HIGH (ref 70–99)
GLUCOSE BLDC GLUCOMTR-MCNC: 129 MG/DL — HIGH (ref 70–99)
GLUCOSE SERPL-MCNC: 94 MG/DL — SIGNIFICANT CHANGE UP (ref 70–99)
HCT VFR BLD CALC: 42.9 % — SIGNIFICANT CHANGE UP (ref 34.5–45)
HGB BLD-MCNC: 13.7 G/DL — SIGNIFICANT CHANGE UP (ref 11.5–15.5)
MAGNESIUM SERPL-MCNC: 2.1 MG/DL — SIGNIFICANT CHANGE UP (ref 1.6–2.6)
MCHC RBC-ENTMCNC: 28.4 PG — SIGNIFICANT CHANGE UP (ref 27–34)
MCHC RBC-ENTMCNC: 31.9 % — LOW (ref 32–36)
MCV RBC AUTO: 89 FL — SIGNIFICANT CHANGE UP (ref 80–100)
NRBC # FLD: 0 K/UL — SIGNIFICANT CHANGE UP (ref 0–0)
PHOSPHATE SERPL-MCNC: 3.8 MG/DL — SIGNIFICANT CHANGE UP (ref 2.5–4.5)
PLATELET # BLD AUTO: 649 K/UL — HIGH (ref 150–400)
PMV BLD: 8.6 FL — SIGNIFICANT CHANGE UP (ref 7–13)
POTASSIUM SERPL-MCNC: 4.6 MMOL/L — SIGNIFICANT CHANGE UP (ref 3.5–5.3)
POTASSIUM SERPL-SCNC: 4.6 MMOL/L — SIGNIFICANT CHANGE UP (ref 3.5–5.3)
PROT SERPL-MCNC: 6.3 G/DL — SIGNIFICANT CHANGE UP (ref 6–8.3)
RBC # BLD: 4.82 M/UL — SIGNIFICANT CHANGE UP (ref 3.8–5.2)
RBC # FLD: 13.2 % — SIGNIFICANT CHANGE UP (ref 10.3–14.5)
SODIUM SERPL-SCNC: 134 MMOL/L — LOW (ref 135–145)
WBC # BLD: 11.05 K/UL — HIGH (ref 3.8–10.5)
WBC # FLD AUTO: 11.05 K/UL — HIGH (ref 3.8–10.5)

## 2020-10-11 RX ORDER — ACETAMINOPHEN 500 MG
650 TABLET ORAL EVERY 6 HOURS
Refills: 0 | Status: DISCONTINUED | OUTPATIENT
Start: 2020-10-11 | End: 2020-10-17

## 2020-10-11 RX ORDER — SENNA PLUS 8.6 MG/1
1 TABLET ORAL DAILY
Refills: 0 | Status: DISCONTINUED | OUTPATIENT
Start: 2020-10-11 | End: 2020-10-17

## 2020-10-11 RX ORDER — POLYETHYLENE GLYCOL 3350 17 G/17G
17 POWDER, FOR SOLUTION ORAL DAILY
Refills: 0 | Status: DISCONTINUED | OUTPATIENT
Start: 2020-10-11 | End: 2020-10-17

## 2020-10-11 RX ADMIN — SENNA PLUS 1 TABLET(S): 8.6 TABLET ORAL at 22:35

## 2020-10-11 RX ADMIN — ALBUTEROL 1 PUFF(S): 90 AEROSOL, METERED ORAL at 02:37

## 2020-10-11 RX ADMIN — ALBUTEROL 1 PUFF(S): 90 AEROSOL, METERED ORAL at 05:10

## 2020-10-11 RX ADMIN — ALBUTEROL 1 PUFF(S): 90 AEROSOL, METERED ORAL at 22:09

## 2020-10-11 RX ADMIN — ALBUTEROL 1 PUFF(S): 90 AEROSOL, METERED ORAL at 14:09

## 2020-10-11 RX ADMIN — POLYETHYLENE GLYCOL 3350 17 GRAM(S): 17 POWDER, FOR SOLUTION ORAL at 18:37

## 2020-10-11 RX ADMIN — Medication 650 MILLIGRAM(S): at 16:15

## 2020-10-11 RX ADMIN — ENOXAPARIN SODIUM 40 MILLIGRAM(S): 100 INJECTION SUBCUTANEOUS at 14:09

## 2020-10-11 RX ADMIN — Medication 650 MILLIGRAM(S): at 17:15

## 2020-10-11 RX ADMIN — ALBUTEROL 1 PUFF(S): 90 AEROSOL, METERED ORAL at 18:37

## 2020-10-11 RX ADMIN — Medication 6 MILLIGRAM(S): at 05:09

## 2020-10-11 RX ADMIN — ALBUTEROL 1 PUFF(S): 90 AEROSOL, METERED ORAL at 10:12

## 2020-10-11 RX ADMIN — REMDESIVIR 500 MILLIGRAM(S): 5 INJECTION INTRAVENOUS at 23:05

## 2020-10-11 NOTE — CONSULT NOTE ADULT - ASSESSMENT
56 yo F without PMH presents to ED with 5 days of fever, cough, shortness of breath found hypoxic in ED requiring bipap and with +COVID-19 to be admitted for further management.     cornovairus pneumonia with hypoxic resp failure:  On remdesivir:  On dexa :  DVT prophylaxis    she is still o n antiviral meds:? remdesivir restarted?reason extended course:   Clinically she looks OK: doubt any superadded bacterial infection:  on dvt prophylaxis  try to wean the oxygen to nasal cannula:   d di elinor has increased a little but for few sdyas ago: but she hasno change in resp symtpoms:  CRP is increased too: follow inflmmatory markers:   ID to see today :   ASHLEY KHOURY

## 2020-10-11 NOTE — DIETITIAN INITIAL EVALUATION ADULT. - PERTINENT MEDS FT
MEDICATIONS  (STANDING):  ALBUTerol    90 MICROgram(s) HFA Inhaler 1 Puff(s) Inhalation every 4 hours  dexAMETHasone  Injectable 6 milliGRAM(s) IV Push daily  dextrose 5%. 1000 milliLiter(s) (50 mL/Hr) IV Continuous <Continuous>  dextrose 50% Injectable 12.5 Gram(s) IV Push once  dextrose 50% Injectable 25 Gram(s) IV Push once  dextrose 50% Injectable 25 Gram(s) IV Push once  enoxaparin Injectable 40 milliGRAM(s) SubCutaneous daily  insulin lispro (HumaLOG) corrective regimen sliding scale   SubCutaneous three times a day before meals  insulin lispro (HumaLOG) corrective regimen sliding scale   SubCutaneous at bedtime  remdesivir  IVPB 100 milliGRAM(s) IV Intermittent every 24 hours

## 2020-10-11 NOTE — CONSULT NOTE ADULT - SUBJECTIVE AND OBJECTIVE BOX
Patient is a 55y old  Female who presents with a chief complaint of SOB (10 Oct 2020 19:14)      HPI:  54 yo F with no significant PMH presented with SOB x 5 days, a/w loss of sense of smell and taste, nausea, and diarrhea. Patient has had diarrhea for about 2 days with 3 episodes of watery diarrhea today. Patient endorses nonproductive cough as well. She states she has been outside. Denies sick contacts but her  recently came down with a fever yesterday. Patient states she had the COVID antibody test done recently and was negative. Per ED documentation, pt went to PCP yesterday and was prescribed a Z-pack. Per EMS she was hypoxic to 80s on RA, improved to 93% on 6L NC.     On arrival to ED, patient was still satting >90s on NC but noted to be tachypneic, with increased WOB. She was placed on BIPAP. (03 Oct 2020 22:16)    she has no underlying lung disease:  she was admited with coronioviurs pneumonia with hypoxic resp failure  currently sheis not SOB: on high flow at 40%:    ?FOLLOWING PRESENT  [x ] Hx of PE/DVT, [ ]x Hx COPD, [ ]x Hx of Asthma, [x ] Hx of Hospitalization, x[ ]  Hx of BiPAP/CPAP use, [ x] Hx of YISSEL    Allergies    No Known Drug Allergies  Nuts (Hives)    Intolerances        PAST MEDICAL & SURGICAL HISTORY:  No pertinent past medical history    No significant past surgical history        FAMILY HISTORY:  No pertinent family history in first degree relatives        Social History: [x  ] TOBACCO                  [ x ] ETOH                                 [x  ] IVDA/DRUGS    REVIEW OF SYSTEMS      General:	fever    Skin/Breast:x  	  Ophthalmologic:x  	  ENMT:	x    Respiratory and Thorax: mild SOB  	  Cardiovascular:	x    Gastrointestinal:	x    Genitourinary:	  x  Musculoskeletal:	x    Neurological:	  x  Psychiatric:	x    Hematology/Lymphatics:	x    Endocrine:	  x  Allergic/Immunologic:	x    MEDICATIONS  (STANDING):  ALBUTerol    90 MICROgram(s) HFA Inhaler 1 Puff(s) Inhalation every 4 hours  dexAMETHasone  Injectable 6 milliGRAM(s) IV Push daily  dextrose 5%. 1000 milliLiter(s) (50 mL/Hr) IV Continuous <Continuous>  dextrose 50% Injectable 12.5 Gram(s) IV Push once  dextrose 50% Injectable 25 Gram(s) IV Push once  dextrose 50% Injectable 25 Gram(s) IV Push once  enoxaparin Injectable 40 milliGRAM(s) SubCutaneous daily  insulin lispro (HumaLOG) corrective regimen sliding scale   SubCutaneous three times a day before meals  insulin lispro (HumaLOG) corrective regimen sliding scale   SubCutaneous at bedtime  remdesivir  IVPB 100 milliGRAM(s) IV Intermittent every 24 hours    MEDICATIONS  (PRN):  acetaminophen   Tablet .. 650 milliGRAM(s) Oral every 6 hours PRN Temp greater or equal to 38C (100.4F)  dextrose 40% Gel 15 Gram(s) Oral once PRN Blood Glucose LESS THAN 70 milliGRAM(s)/deciliter  glucagon  Injectable 1 milliGRAM(s) IntraMuscular once PRN Glucose LESS THAN 70 milligrams/deciliter       Vital Signs Last 24 Hrs  T(C): 36.6 (11 Oct 2020 09:51), Max: 36.9 (10 Oct 2020 20:55)  T(F): 97.9 (11 Oct 2020 09:51), Max: 98.4 (10 Oct 2020 20:55)  HR: 68 (11 Oct 2020 09:51) (56 - 82)  BP: 105/62 (11 Oct 2020 09:51) (97/62 - 110/74)  BP(mean): --  RR: 18 (11 Oct 2020 09:51) (16 - 22)  SpO2: 93% (11 Oct 2020 09:51) (93% - 100%)        I&O's Summary    10 Oct 2020 07:01  -  11 Oct 2020 07:00  --------------------------------------------------------  IN: 1075 mL / OUT: 400 mL / NET: 675 mL    11 Oct 2020 07:01  -  11 Oct 2020 12:58  --------------------------------------------------------  IN: 160 mL / OUT: 0 mL / NET: 160 mL        Physical Exam:   GENERAL: NAD, well-groomed, well-developed  HEENT: COLLIN/   Atraumatic, Normocephalic  ENMT: No tonsillar erythema, exudates, or enlargement; Moist mucous membranes, Good dentition, No lesions  NECK: Supple, No JVD, Normal thyroid  CHEST/LUNG: Clear to auscultation bilaterally; No rales, rhonchi, wheezing, or rubs  CVS: Regular rate and rhythm; No murmurs, rubs, or gallops  GI: : Soft, Nontender, Nondistended; Bowel sounds present  NERVOUS SYSTEM:  Alert & Oriented X3  EXTREMITIES:  2+ Peripheral Pulses, No clubbing, cyanosis, or edema  LYMPH: No lymphadenopathy noted  SKIN: No rashes or lesions  ENDOCRINOLOGY: No Thyromegaly  PSYCH: Appropriate    Labs:                              13.7   11.05 )-----------( 649      ( 11 Oct 2020 06:00 )             42.9                         13.9   10.67 )-----------( 619      ( 10 Oct 2020 07:50 )             43.5                         13.9   10.85 )-----------( 548      ( 09 Oct 2020 06:20 )             43.7                         13.1   12.56 )-----------( 508      ( 08 Oct 2020 05:45 )             40.2     10-11    134<L>  |  101  |  21  ----------------------------<  94  4.6   |  25  |  0.55  10-10    136  |  101  |  19  ----------------------------<  95  4.5   |  25  |  0.55  10-09    137  |  100  |  18  ----------------------------<  92  4.6   |  25  |  0.57  10-08    x   |  x   |  x   ----------------------------<  x   x    |  x   |  0.73  10-08    136  |  102  |  18  ----------------------------<  98  4.1   |  24  |  0.51    Ca    8.4      11 Oct 2020 06:00  Ca    9.2      10 Oct 2020 07:50  Phos  3.8     10-11  Phos  3.4     10-10  Mg     2.1     10-11  Mg     2.2     10-10    TPro  6.3  /  Alb  3.2<L>  /  TBili  0.3  /  DBili  < 0.2  /  AST  14  /  ALT  10  /  AlkPhos  91  10-11  TPro  6.7  /  Alb  3.1<L>  /  TBili  0.3  /  DBili  < 0.2  /  AST  16  /  ALT  13  /  AlkPhos  96  10-10  TPro  6.6  /  Alb  3.1<L>  /  TBili  0.3  /  DBili  < 0.2  /  AST  15  /  ALT  14  /  AlkPhos  94  10-09  TPro  6.8  /  Alb  3.3  /  TBili  0.3  /  DBili  < 0.2  /  AST  14  /  ALT  18  /  AlkPhos  92  10-08  TPro  6.6  /  Alb  3.2<L>  /  TBili  0.3  /  DBili  x   /  AST  16  /  ALT  17  /  AlkPhos  84  10-08    CAPILLARY BLOOD GLUCOSE      POCT Blood Glucose.: 126 mg/dL (11 Oct 2020 11:51)  POCT Blood Glucose.: 105 mg/dL (11 Oct 2020 07:30)  POCT Blood Glucose.: 125 mg/dL (10 Oct 2020 21:11)  POCT Blood Glucose.: 154 mg/dL (10 Oct 2020 16:50)    LIVER FUNCTIONS - ( 11 Oct 2020 06:00 )  Alb: 3.2 g/dL / Pro: 6.3 g/dL / ALK PHOS: 91 u/L / ALT: 10 u/L / AST: 14 u/L / GGT: x               D DImer  D-Dimer Assay, Quantitative: 154 ng/mL (10-07 @ 06:50)      Studies  Chest X-RAY  CT SCAN Chest   CT Abdomen  Venous Dopplers: LE:   Others      < from: Xray Chest 1 View- PORTABLE-Urgent (10.03.20 @ 16:05) >  EXAM:  XR CHEST PORTABLE URGENT 1V        PROCEDURE DATE:  Oct  3 2020         INTERPRETATION:  CLINICAL INFORMATION: Shortness of breath, cough, fever.    EXAM: Frontal radiograph of the chest.    COMPARISON: Chest radiograph from    FINDINGS:  Limited inspiration.  Right base linear opacity, left base patchy opacity.  There is no pleural effusion or pneumothorax.  The heart size is not well evaluated on this projection.  The visualized osseous structures demonstrate no acute pathology.    IMPRESSION:  Limited inspiration, right lower lung linear atelectasis, left lower lung atelectasis vs pneumonia.            AIDEN FREEDMAN M.D., RADIOLOGY RESIDENT  This document has been electronically signed.  JUAN CARLOS KENT M.D., ATTENDING RADIOLOGIST  This document has been electronically signed. Oct  4 2020  8:02AM    < end of copied text >

## 2020-10-11 NOTE — DIETITIAN INITIAL EVALUATION ADULT. - OTHER INFO
56 y/o female admitted with dx of acute hypoxic respiratory failure 2/2 COVID+. Unable to conduct a face to face interview or nutrition-focused physical exam due to limited contact restrictions related to pt's medical condition and isolation precautions. Attempted to contact pt on room phone, however call was not answered. Collateral obtained from chart review. Unable to assess PO intake at the present time. No GI distress (nausea/vomiting/diarrhea/constipation) currently, per chart; Last BM 10/9. No chewing or swallowing difficulties noted. Per H&P, pt had experienced loss of smell and taste with N/V x 5 days PTA and diarrhea x 2/days PTA. GI distress has noted to have since resolved. Unable to assess wt history. BMI in relation to weight is in the overweight range. If oral intake is still suboptimal, consider Ensure Enlive 2x daily (700 lanny and 40 gm protein) to optimize oral intake. RDN services to remain available as needed.

## 2020-10-11 NOTE — DIETITIAN INITIAL EVALUATION ADULT. - NS FNS WEIGHT USED FOR CALC
1) have your family doctor send me copies of labs that were done  2) do your neuropsychology evaluation  3) schedule a follow up in September    ideal

## 2020-10-11 NOTE — PROGRESS NOTE ADULT - ASSESSMENT
54 yo F without PMH presents to ED with 5 days of fever, cough, shortness of breath found hypoxic in ED requiring bipap and with +COVID-19    Problem/Plan - 1:  ·  Problem: Acute hypoxemic respiratory failure.  Plan: Likely 2/2 COVID, currently on HFNC   -continue remdesivir for extended course (10/3-12) and decadron (10/3-)  -monitor cont. pulse ox. wean off HFNC as tolerated  -albuterol as needed.     Problem/Plan - 2:  ·  Problem: 2019 novel coronavirus disease (COVID-19).  Plan: COVID PCR positive on 10/3  - transitioning to high flow as tolerated-continue remdesivir (10/3-12) and decadron (10/3-)  - Monitor daily renal and hepatic function while on Remdesivir   - Lovenox for DVT ppx- BMI 28  - Trend biomarkers.     Problem/Plan - 3:  ·  Problem: Leukocytosis, unspecified type.  Plan: - leuokocytosis due to steroids- Procalcitonin mildly elevated, low suspicion for superimposed bacterial PNA  - Continue to trend/monitor for fevers.     Problem/Plan - 4:  ·  Problem: Prophylactic measure.  Plan: - DVT: lovenox.

## 2020-10-11 NOTE — DIETITIAN INITIAL EVALUATION ADULT. - PERTINENT LABORATORY DATA
10-11 Na 134 mmol/L<L> Glu 94 mg/dL K+ 4.6 mmol/L Cr 0.55 mg/dL BUN 21 mg/dL Phos 3.8 mg/dL  10-11 @ 11:51 POCT 126 mg/dL  10-11 @ 07:30 POCT 105 mg/dL  10-10 @ 21:11 POCT 125 mg/dL  10-10 @ 16:50 POCT 154 mg/dL

## 2020-10-11 NOTE — PROGRESS NOTE ADULT - SUBJECTIVE AND OBJECTIVE BOX
Patient is a 55y old  Female who presents with a chief complaint of SOB (11 Oct 2020 12:58)      INTERVAL HPI/OVERNIGHT EVENTS:  T(C): 36.7 (10-11-20 @ 17:50), Max: 36.9 (10-10-20 @ 20:55)  HR: 70 (10-11-20 @ 18:02) (56 - 71)  BP: 103/63 (10-11-20 @ 17:50) (97/62 - 105/62)  RR: 18 (10-11-20 @ 18:02) (16 - 22)  SpO2: 98% (10-11-20 @ 18:02) (93% - 100%)  Wt(kg): --  I&O's Summary    10 Oct 2020 07:01  -  11 Oct 2020 07:00  --------------------------------------------------------  IN: 1075 mL / OUT: 400 mL / NET: 675 mL    11 Oct 2020 07:01  -  11 Oct 2020 19:29  --------------------------------------------------------  IN: 560 mL / OUT: 0 mL / NET: 560 mL        LABS:                        13.7   11.05 )-----------( 649      ( 11 Oct 2020 06:00 )             42.9     10-11    134<L>  |  101  |  21  ----------------------------<  94  4.6   |  25  |  0.55    Ca    8.4      11 Oct 2020 06:00  Phos  3.8     10-11  Mg     2.1     10-11    TPro  6.3  /  Alb  3.2<L>  /  TBili  0.3  /  DBili  < 0.2  /  AST  14  /  ALT  10  /  AlkPhos  91  10-11        CAPILLARY BLOOD GLUCOSE      POCT Blood Glucose.: 129 mg/dL (11 Oct 2020 16:32)  POCT Blood Glucose.: 126 mg/dL (11 Oct 2020 11:51)  POCT Blood Glucose.: 105 mg/dL (11 Oct 2020 07:30)  POCT Blood Glucose.: 125 mg/dL (10 Oct 2020 21:11)            MEDICATIONS  (STANDING):  ALBUTerol    90 MICROgram(s) HFA Inhaler 1 Puff(s) Inhalation every 4 hours  dexAMETHasone  Injectable 6 milliGRAM(s) IV Push daily  dextrose 5%. 1000 milliLiter(s) (50 mL/Hr) IV Continuous <Continuous>  dextrose 50% Injectable 25 Gram(s) IV Push once  dextrose 50% Injectable 12.5 Gram(s) IV Push once  dextrose 50% Injectable 25 Gram(s) IV Push once  enoxaparin Injectable 40 milliGRAM(s) SubCutaneous daily  insulin lispro (HumaLOG) corrective regimen sliding scale   SubCutaneous three times a day before meals  insulin lispro (HumaLOG) corrective regimen sliding scale   SubCutaneous at bedtime  polyethylene glycol 3350 17 Gram(s) Oral daily  remdesivir  IVPB 100 milliGRAM(s) IV Intermittent every 24 hours  senna 1 Tablet(s) Oral daily    MEDICATIONS  (PRN):  acetaminophen   Tablet .. 650 milliGRAM(s) Oral every 6 hours PRN Temp greater or equal to 38C (100.4F), Moderate Pain (4 - 6)  dextrose 40% Gel 15 Gram(s) Oral once PRN Blood Glucose LESS THAN 70 milliGRAM(s)/deciliter  glucagon  Injectable 1 milliGRAM(s) IntraMuscular once PRN Glucose LESS THAN 70 milligrams/deciliter          PHYSICAL EXAM:  GENERAL: NAD, well-groomed, well-developed  HEAD:  Atraumatic, Normocephalic  CHEST/LUNG: Clear to percussion bilaterally; No rales, rhonchi, wheezing, or rubs  HEART: Regular rate and rhythm; No murmurs, rubs, or gallops  ABDOMEN: Soft, Nontender, Nondistended; Bowel sounds present  EXTREMITIES:  2+ Peripheral Pulses, No clubbing, cyanosis, or edema  LYMPH: No lymphadenopathy noted  SKIN: No rashes or lesions    Care Discussed with Consultants/Other Providers [ ] YES  [ ] NO

## 2020-10-12 LAB
ANION GAP SERPL CALC-SCNC: 11 MMO/L — SIGNIFICANT CHANGE UP (ref 7–14)
BUN SERPL-MCNC: 21 MG/DL — SIGNIFICANT CHANGE UP (ref 7–23)
CALCIUM SERPL-MCNC: 8.8 MG/DL — SIGNIFICANT CHANGE UP (ref 8.4–10.5)
CHLORIDE SERPL-SCNC: 100 MMOL/L — SIGNIFICANT CHANGE UP (ref 98–107)
CO2 SERPL-SCNC: 24 MMOL/L — SIGNIFICANT CHANGE UP (ref 22–31)
CREAT SERPL-MCNC: 0.55 MG/DL — SIGNIFICANT CHANGE UP (ref 0.5–1.3)
GLUCOSE BLDC GLUCOMTR-MCNC: 107 MG/DL — HIGH (ref 70–99)
GLUCOSE BLDC GLUCOMTR-MCNC: 186 MG/DL — HIGH (ref 70–99)
GLUCOSE BLDC GLUCOMTR-MCNC: 286 MG/DL — HIGH (ref 70–99)
GLUCOSE BLDC GLUCOMTR-MCNC: 94 MG/DL — SIGNIFICANT CHANGE UP (ref 70–99)
GLUCOSE SERPL-MCNC: 89 MG/DL — SIGNIFICANT CHANGE UP (ref 70–99)
HCT VFR BLD CALC: 42 % — SIGNIFICANT CHANGE UP (ref 34.5–45)
HGB BLD-MCNC: 13.5 G/DL — SIGNIFICANT CHANGE UP (ref 11.5–15.5)
MAGNESIUM SERPL-MCNC: 2.2 MG/DL — SIGNIFICANT CHANGE UP (ref 1.6–2.6)
MCHC RBC-ENTMCNC: 28.4 PG — SIGNIFICANT CHANGE UP (ref 27–34)
MCHC RBC-ENTMCNC: 32.1 % — SIGNIFICANT CHANGE UP (ref 32–36)
MCV RBC AUTO: 88.4 FL — SIGNIFICANT CHANGE UP (ref 80–100)
NRBC # FLD: 0 K/UL — SIGNIFICANT CHANGE UP (ref 0–0)
PHOSPHATE SERPL-MCNC: 3.9 MG/DL — SIGNIFICANT CHANGE UP (ref 2.5–4.5)
PLATELET # BLD AUTO: 623 K/UL — HIGH (ref 150–400)
PMV BLD: 8.7 FL — SIGNIFICANT CHANGE UP (ref 7–13)
POTASSIUM SERPL-MCNC: 4.6 MMOL/L — SIGNIFICANT CHANGE UP (ref 3.5–5.3)
POTASSIUM SERPL-SCNC: 4.6 MMOL/L — SIGNIFICANT CHANGE UP (ref 3.5–5.3)
RBC # BLD: 4.75 M/UL — SIGNIFICANT CHANGE UP (ref 3.8–5.2)
RBC # FLD: 13.1 % — SIGNIFICANT CHANGE UP (ref 10.3–14.5)
SODIUM SERPL-SCNC: 135 MMOL/L — SIGNIFICANT CHANGE UP (ref 135–145)
WBC # BLD: 10.87 K/UL — HIGH (ref 3.8–10.5)
WBC # FLD AUTO: 10.87 K/UL — HIGH (ref 3.8–10.5)

## 2020-10-12 RX ORDER — LACTULOSE 10 G/15ML
10 SOLUTION ORAL
Refills: 0 | Status: DISCONTINUED | OUTPATIENT
Start: 2020-10-12 | End: 2020-10-17

## 2020-10-12 RX ORDER — LACTULOSE 10 G/15ML
200 SOLUTION ORAL ONCE
Refills: 0 | Status: DISCONTINUED | OUTPATIENT
Start: 2020-10-12 | End: 2020-10-12

## 2020-10-12 RX ADMIN — Medication 6 MILLIGRAM(S): at 05:27

## 2020-10-12 RX ADMIN — REMDESIVIR 500 MILLIGRAM(S): 5 INJECTION INTRAVENOUS at 23:57

## 2020-10-12 RX ADMIN — ALBUTEROL 1 PUFF(S): 90 AEROSOL, METERED ORAL at 11:23

## 2020-10-12 RX ADMIN — ALBUTEROL 1 PUFF(S): 90 AEROSOL, METERED ORAL at 23:06

## 2020-10-12 RX ADMIN — POLYETHYLENE GLYCOL 3350 17 GRAM(S): 17 POWDER, FOR SOLUTION ORAL at 11:55

## 2020-10-12 RX ADMIN — ALBUTEROL 1 PUFF(S): 90 AEROSOL, METERED ORAL at 02:31

## 2020-10-12 RX ADMIN — ENOXAPARIN SODIUM 40 MILLIGRAM(S): 100 INJECTION SUBCUTANEOUS at 11:21

## 2020-10-12 RX ADMIN — ALBUTEROL 1 PUFF(S): 90 AEROSOL, METERED ORAL at 14:00

## 2020-10-12 RX ADMIN — Medication 3: at 11:28

## 2020-10-12 RX ADMIN — ALBUTEROL 1 PUFF(S): 90 AEROSOL, METERED ORAL at 19:48

## 2020-10-12 RX ADMIN — SENNA PLUS 1 TABLET(S): 8.6 TABLET ORAL at 11:23

## 2020-10-12 RX ADMIN — ALBUTEROL 1 PUFF(S): 90 AEROSOL, METERED ORAL at 05:28

## 2020-10-12 RX ADMIN — ALBUTEROL 1 PUFF(S): 90 AEROSOL, METERED ORAL at 11:22

## 2020-10-12 NOTE — PROGRESS NOTE ADULT - ASSESSMENT
56 yo F without PMH presents to ED with 5 days of fever, cough, shortness of breath found hypoxic in ED requiring bipap and with +COVID-19 to be admitted for further management.     cornovairus pneumonia with hypoxic resp failure:  On remdesivir:  On dexa :  DVT prophylaxis    she is still o n antiviral meds:? remdesivir restarted?reason extended course:   Clinically she looks OK: doubt any superadded bacterial infection:  on dvt prophylaxis  try to wean the oxygen to nasal cannula:   d di elinor has increased a little but for few sdyas ago: but she hasno change in resp symtpoms:  CRP is increased too: follow inflmmatory markers:   ID to see today :   ASHLEY KHOURY      10/12:  she seems to be doing pretty good:  her oxygenation is better too:  would change to nasal cannula:  on anti viral as well as steroids:  She is totally symptomatic  dvt prophylaxis  hopefully will cont to improve : dc planning in 1- 2 days:  DW NP

## 2020-10-12 NOTE — PHYSICAL THERAPY INITIAL EVALUATION ADULT - CRITERIA FOR SKILLED THERAPEUTIC INTERVENTIONS
anticipated discharge recommendation/impairments found/therapy frequency/rehab potential/anticipated equipment needs at discharge/predicted duration of therapy intervention

## 2020-10-12 NOTE — PROGRESS NOTE ADULT - ASSESSMENT
54 yo F without PMH presents to ED with 5 days of fever, cough, shortness of breath found hypoxic in ED requiring bipap and with +COVID-19    Problem/Plan - 1:  ·  Problem: Acute hypoxemic respiratory failure.  Plan: Likely 2/2 COVID, currently on HFNC   -continue remdesivir for extended course (10/3-12) and decadron (10/3-)  -monitor cont. pulse ox. wean off HFNC as tolerated  -albuterol as needed.     Problem/Plan - 2:  ·  Problem: 2019 novel coronavirus disease (COVID-19).  Plan: COVID PCR positive on 10/3  - transitioning to high flow as tolerated-continue remdesivir (10/3-12) and decadron (10/3-)  - Monitor daily renal and hepatic function while on Remdesivir   - Lovenox for DVT ppx- BMI 28  - Trend biomarkers.     Problem/Plan - 3:·  Problem: Leukocytosis, unspecified type.  Plan: - leuokocytosis due to steroids- Procalcitonin mildly elevated, low suspicion for superimposed bacterial PNA  - Continue to trend/monitor for fevers.     Problem/Plan - 4:  ·  Problem: Prophylactic measure.  Plan: - DVT: lovenox. ·  Problem: Leukocytosis, unspecified type.  Plan: - leuokocytosis due to steroids- Procalcitonin mildly elevated, low suspicion for superimposed bacterial PNA  - Continue to trend/monitor for fevers.     Problem/Plan - 4:  ·  Problem: Prophylactic measure.  Plan: - DVT: lovenox.

## 2020-10-12 NOTE — CONSULT NOTE ADULT - ASSESSMENT
54 yo F with no significant PMH presented with SOB x 5 days, a/w loss of sense of smell and taste, nausea, and diarrhea. Patient has had diarrhea for about 2 days with 3 episodes of watery diarrhea today. Patient endorses nonproductive cough as well. She states she has been outside. Denies sick contacts but her  recently came down with a fever yesterday. Patient states she had the COVID antibody test done recently and was negative. Per ED documentation, pt went to PCP yesterday and was prescribed a Z-pack. Per EMS she was hypoxic to 80s on RA, improved to 93% on 6L NC.     On arrival to ED, patient was still satting >90s on NC but noted to be tachypneic, with increased WOB. She was placed on BIPAP. (03 Oct 2020 22:16)    HOSP COURSE:    Pt on remdesivir, dexamethasone and prophylactic lovenox.  Clinically improving, pt weaned off of bipap, and satting well on room air (as of 10/12).  Cxr clear.    ID Consult called for further recommendations. 56 yo F with no significant PMH presented with SOB x 5 days, a/w loss of sense of smell and taste, nausea, and diarrhea. Patient has had diarrhea for about 2 days with 3 episodes of watery diarrhea today. Patient endorses nonproductive cough as well. She states she has been outside. Denies sick contacts but her  recently came down with a fever yesterday. Patient states she had the COVID antibody test done recently and was negative. Per ED documentation, pt went to PCP yesterday and was prescribed a Z-pack. Per EMS she was hypoxic to 80s on RA, improved to 93% on 6L NC.     On arrival to ED, patient was still satting >90s on NC but noted to be tachypneic, with increased WOB. She was placed on BIPAP. (03 Oct 2020 22:16)    HOSP COURSE:    Pt on remdesivir, dexamethasone and prophylactic lovenox.  Clinically improving, pt weaned off of bipap, and satting well on room air (as of 10/12).  Cxr clear.    ID Consult called for further recommendations.       Covid-19 with acute hypoxic resp failure:    - Pt with initial hypoxia to 80s on RA, and tachypneic at presentation, requiring Bipap for increased WOB.  Pt seen by [facility], deemed not a candidate.  Clinical findings likely secondary to Covid-19 pna.  Cov pcr and Abs (+).      - Pt on extended course of remedesivir as per primary team.  Also on dexamethasone IV.  Complete 10 day course on 10/13.  Pt clinically improving, satting well on RA.  Afebrile.    - No evidence for superimposed bacterial pna on cxr.  No productive cough or fever.  No indication for abx at this time.     CRP: 10 <-- 263  DD:  154 <--  less than 150  Ferritin: 165 <-- 328  PCT:  0.15 <-- 0.68      Asymptomatic bacteruria:    - UA (neg), Ucx < 50K E.coli, likely contaminant.  Pt w/o urinary symptoms.  No indication for abx.       [facility]  [phone]

## 2020-10-12 NOTE — PHYSICAL THERAPY INITIAL EVALUATION ADULT - ADDITIONAL COMMENTS
Pt. was left seated in chair post PT Evaluation, no apparent distress, all lines intact, call bell within reach. SpO2 remained above 92% throughout session. RN made aware of pt. status and participation in PT.

## 2020-10-12 NOTE — PROGRESS NOTE ADULT - SUBJECTIVE AND OBJECTIVE BOX
Patient is a 55y old  Female who presents with a chief complaint of SOB (12 Oct 2020 11:34)      INTERVAL HPI/OVERNIGHT EVENTS:  T(C): 36.7 (10-12-20 @ 13:36), Max: 36.7 (10-12-20 @ 11:07)  HR: 64 (10-12-20 @ 17:31) (50 - 79)  BP: 109/63 (10-12-20 @ 13:36) (96/63 - 109/63)  RR: 18 (10-12-20 @ 17:31) (17 - 19)  SpO2: 100% (10-12-20 @ 17:31) (94% - 100%)  Wt(kg): --  I&O's Summary    11 Oct 2020 07:01  -  12 Oct 2020 07:00  --------------------------------------------------------  IN: 560 mL / OUT: 1050 mL / NET: -490 mL        LABS:                        13.5   10.87 )-----------( 623      ( 12 Oct 2020 05:55 )             42.0     10-12    135  |  100  |  21  ----------------------------<  89  4.6   |  24  |  0.55    Ca    8.8      12 Oct 2020 05:55  Phos  3.9     10-12  Mg     2.2     10-12    TPro  6.3  /  Alb  3.2<L>  /  TBili  0.3  /  DBili  < 0.2  /  AST  14  /  ALT  10  /  AlkPhos  91  10-11        CAPILLARY BLOOD GLUCOSE      POCT Blood Glucose.: 107 mg/dL (12 Oct 2020 16:42)  POCT Blood Glucose.: 286 mg/dL (12 Oct 2020 11:26)  POCT Blood Glucose.: 94 mg/dL (12 Oct 2020 07:05)  POCT Blood Glucose.: 124 mg/dL (11 Oct 2020 21:27)            MEDICATIONS  (STANDING):  ALBUTerol    90 MICROgram(s) HFA Inhaler 1 Puff(s) Inhalation every 4 hours  bisacodyl Suppository 10 milliGRAM(s) Rectal once  dexAMETHasone  Injectable 6 milliGRAM(s) IV Push daily  dextrose 5%. 1000 milliLiter(s) (50 mL/Hr) IV Continuous <Continuous>  dextrose 50% Injectable 12.5 Gram(s) IV Push once  dextrose 50% Injectable 25 Gram(s) IV Push once  dextrose 50% Injectable 25 Gram(s) IV Push once  enoxaparin Injectable 40 milliGRAM(s) SubCutaneous daily  insulin lispro (HumaLOG) corrective regimen sliding scale   SubCutaneous three times a day before meals  insulin lispro (HumaLOG) corrective regimen sliding scale   SubCutaneous at bedtime  lactulose Syrup 10 Gram(s) Oral two times a day  polyethylene glycol 3350 17 Gram(s) Oral daily  remdesivir  IVPB 100 milliGRAM(s) IV Intermittent every 24 hours  senna 1 Tablet(s) Oral daily    MEDICATIONS  (PRN):  acetaminophen   Tablet .. 650 milliGRAM(s) Oral every 6 hours PRN Temp greater or equal to 38C (100.4F), Moderate Pain (4 - 6)  dextrose 40% Gel 15 Gram(s) Oral once PRN Blood Glucose LESS THAN 70 milliGRAM(s)/deciliter  glucagon  Injectable 1 milliGRAM(s) IntraMuscular once PRN Glucose LESS THAN 70 milligrams/deciliter          PHYSICAL EXAM:  GENERAL: NAD, well-groomed, well-developed  HEAD:  Atraumatic, Normocephalic  CHEST/LUNG: Clear to percussion bilaterally; No rales, rhonchi, wheezing, or rubs  HEART: Regular rate and rhythm; No murmurs, rubs, or gallops  ABDOMEN: Soft, Nontender, Nondistended; Bowel sounds present  EXTREMITIES:  2+ Peripheral Pulses, No clubbing, cyanosis, or edema  LYMPH: No lymphadenopathy noted  SKIN: No rashes or lesions    Care Discussed with Consultants/Other Providers [ ] YES  [ ] NO

## 2020-10-12 NOTE — PROGRESS NOTE ADULT - SUBJECTIVE AND OBJECTIVE BOX
Patient is a 55y old  Female who presents with a chief complaint of SOB (12 Oct 2020 11:24)      Any change in ROS: She seems to be doing pretty good:  on high flow @ 40%:  sao2  100%  she has no symptoms       MEDICATIONS  (STANDING):  ALBUTerol    90 MICROgram(s) HFA Inhaler 1 Puff(s) Inhalation every 4 hours  dexAMETHasone  Injectable 6 milliGRAM(s) IV Push daily  dextrose 5%. 1000 milliLiter(s) (50 mL/Hr) IV Continuous <Continuous>  dextrose 50% Injectable 12.5 Gram(s) IV Push once  dextrose 50% Injectable 25 Gram(s) IV Push once  dextrose 50% Injectable 25 Gram(s) IV Push once  enoxaparin Injectable 40 milliGRAM(s) SubCutaneous daily  insulin lispro (HumaLOG) corrective regimen sliding scale   SubCutaneous three times a day before meals  insulin lispro (HumaLOG) corrective regimen sliding scale   SubCutaneous at bedtime  lactulose Retention Enema 200 Gram(s) Rectal once  lactulose Syrup 10 Gram(s) Oral two times a day  polyethylene glycol 3350 17 Gram(s) Oral daily  remdesivir  IVPB 100 milliGRAM(s) IV Intermittent every 24 hours  senna 1 Tablet(s) Oral daily    MEDICATIONS  (PRN):  acetaminophen   Tablet .. 650 milliGRAM(s) Oral every 6 hours PRN Temp greater or equal to 38C (100.4F), Moderate Pain (4 - 6)  dextrose 40% Gel 15 Gram(s) Oral once PRN Blood Glucose LESS THAN 70 milliGRAM(s)/deciliter  glucagon  Injectable 1 milliGRAM(s) IntraMuscular once PRN Glucose LESS THAN 70 milligrams/deciliter    Vital Signs Last 24 Hrs  T(C): 36.7 (12 Oct 2020 11:07), Max: 36.7 (11 Oct 2020 17:50)  T(F): 98 (12 Oct 2020 11:07), Max: 98.1 (11 Oct 2020 17:50)  HR: 68 (12 Oct 2020 11:07) (50 - 71)  BP: 96/63 (12 Oct 2020 11:07) (96/63 - 104/66)  BP(mean): --  RR: 17 (12 Oct 2020 11:07) (17 - 18)  SpO2: 95% (12 Oct 2020 11:07) (93% - 100%)    I&O's Summary    11 Oct 2020 07:01  -  12 Oct 2020 07:00  --------------------------------------------------------  IN: 560 mL / OUT: 1050 mL / NET: -490 mL          Physical Exam:   GENERAL: NAD, well-groomed, well-developed  HEENT: COLLIN/   Atraumatic, Normocephalic  ENMT: No tonsillar erythema, exudates, or enlargement; Moist mucous membranes, Good dentition, No lesions  NECK: Supple, No JVD, Normal thyroid  CHEST/LUNG:clear:   CVS: Regular rate and rhythm; No murmurs, rubs, or gallops  GI: : Soft, Nontender, Nondistended; Bowel sounds present  NERVOUS SYSTEM:  Alert & Oriented X3  EXTREMITIES: -edema  LYMPH: No lymphadenopathy noted  SKIN: No rashes or lesions  ENDOCRINOLOGY: No Thyromegaly  PSYCH: Appropriate    Labs:                              13.5   10.87 )-----------( 623      ( 12 Oct 2020 05:55 )             42.0                         13.7   11.05 )-----------( 649      ( 11 Oct 2020 06:00 )             42.9                         13.9   10.67 )-----------( 619      ( 10 Oct 2020 07:50 )             43.5                         13.9   10.85 )-----------( 548      ( 09 Oct 2020 06:20 )             43.7     10-12    135  |  100  |  21  ----------------------------<  89  4.6   |  24  |  0.55  10-11    134<L>  |  101  |  21  ----------------------------<  94  4.6   |  25  |  0.55  10-10    136  |  101  |  19  ----------------------------<  95  4.5   |  25  |  0.55  10-09    137  |  100  |  18  ----------------------------<  92  4.6   |  25  |  0.57  10-08    x   |  x   |  x   ----------------------------<  x   x    |  x   |  0.73    Ca    8.8      12 Oct 2020 05:55  Ca    8.4      11 Oct 2020 06:00  Phos  3.9     10-12  Phos  3.8     10-11  Mg     2.2     10-12  Mg     2.1     10-11    TPro  6.3  /  Alb  3.2<L>  /  TBili  0.3  /  DBili  < 0.2  /  AST  14  /  ALT  10  /  AlkPhos  91  10-11  TPro  6.7  /  Alb  3.1<L>  /  TBili  0.3  /  DBili  < 0.2  /  AST  16  /  ALT  13  /  AlkPhos  96  10-10  TPro  6.6  /  Alb  3.1<L>  /  TBili  0.3  /  DBili  < 0.2  /  AST  15  /  ALT  14  /  AlkPhos  94  10-09  TPro  6.8  /  Alb  3.3  /  TBili  0.3  /  DBili  < 0.2  /  AST  14  /  ALT  18  /  AlkPhos  92  10-08    CAPILLARY BLOOD GLUCOSE      POCT Blood Glucose.: 286 mg/dL (12 Oct 2020 11:26)  POCT Blood Glucose.: 94 mg/dL (12 Oct 2020 07:05)  POCT Blood Glucose.: 124 mg/dL (11 Oct 2020 21:27)  POCT Blood Glucose.: 129 mg/dL (11 Oct 2020 16:32)  POCT Blood Glucose.: 126 mg/dL (11 Oct 2020 11:51)      LIVER FUNCTIONS - ( 11 Oct 2020 06:00 )  Alb: 3.2 g/dL / Pro: 6.3 g/dL / ALK PHOS: 91 u/L / ALT: 10 u/L / AST: 14 u/L / GGT: x               D-Dimer Assay, Quantitative: 154 ng/mL (10-07 @ 06:50)        RECENT CULTURES:  < from: Xray Chest 1 View- PORTABLE-Urgent (10.03.20 @ 16:05) >  EXAM:  XR CHEST PORTABLE URGENT 1V        PROCEDURE DATE:  Oct  3 2020         INTERPRETATION:  CLINICAL INFORMATION: Shortness of breath, cough, fever.    EXAM: Frontal radiograph of the chest.    COMPARISON: Chest radiograph from    FINDINGS:  Limited inspiration.  Right base linear opacity, left base patchy opacity.  There is no pleural effusion or pneumothorax.  The heart size is not well evaluated on this projection.  The visualized osseous structures demonstrate no acute pathology.    IMPRESSION:  Limited inspiration, right lower lung linear atelectasis, left lower lung atelectasis vs pneumonia.            AIDEN FREEDMAN M.D., RADIOLOGY RESIDENT  This document has been electronically signed.  JUAN CARLOS KENT M.D., ATTENDING RADIOLOGIST  This document has been electronically signed. Oct  4 2020  8:02AM    < end of copied text >        RESPIRATORY CULTURES:          Studies  Chest X-RAY  CT SCAN Chest   Venous Dopplers: LE:   CT Abdomen  Others

## 2020-10-12 NOTE — CONSULT NOTE ADULT - SUBJECTIVE AND OBJECTIVE BOX
Patient is a 55y old  Female who presents with a chief complaint of SOB (11 Oct 2020 19:29)      HPI:  56 yo F with no significant PMH presented with SOB x 5 days, a/w loss of sense of smell and taste, nausea, and diarrhea. Patient has had diarrhea for about 2 days with 3 episodes of watery diarrhea today. Patient endorses nonproductive cough as well. She states she has been outside. Denies sick contacts but her  recently came down with a fever yesterday. Patient states she had the COVID antibody test done recently and was negative. Per ED documentation, pt went to PCP yesterday and was prescribed a Z-pack. Per EMS she was hypoxic to 80s on RA, improved to 93% on 6L NC.     On arrival to ED, patient was still satting >90s on NC but noted to be tachypneic, with increased WOB. She was placed on BIPAP. (03 Oct 2020 22:16)      REVIEW OF SYSTEMS:    CONSTITUTIONAL: No fever, weight loss, or fatigue  EYES: No eye pain, visual disturbances, or discharge  ENMT:  No sore throat  NECK: No pain or stiffness  RESPIRATORY: No cough, wheezing, chills or hemoptysis; No shortness of breath  CARDIOVASCULAR: No chest pain, palpitations, dizziness, or leg swelling  GASTROINTESTINAL: No abdominal or epigastric pain. No nausea, vomiting, or hematemesis; No diarrhea or constipation. No melena or hematochezia.  GENITOURINARY: No dysuria, frequency, hematuria, or incontinence  NEUROLOGICAL: No headaches, memory loss, loss of strength, numbness, or tremors  SKIN: No itching, burning, rashes, or lesions   LYMPH NODES: No enlarged glands  MUSCULOSKELETAL: No joint pain or swelling; No muscle, back, or extremity pain      PAST MEDICAL & SURGICAL HISTORY:  No pertinent past medical history    No significant past surgical history        Allergies    No Known Drug Allergies  Nuts (Hives)    Intolerances        FAMILY HISTORY:  No pertinent family history in first degree relatives        SOCIAL HISTORY:        MEDICATIONS  (STANDING):  ALBUTerol    90 MICROgram(s) HFA Inhaler 1 Puff(s) Inhalation every 4 hours  dexAMETHasone  Injectable 6 milliGRAM(s) IV Push daily  dextrose 5%. 1000 milliLiter(s) (50 mL/Hr) IV Continuous <Continuous>  dextrose 50% Injectable 12.5 Gram(s) IV Push once  dextrose 50% Injectable 25 Gram(s) IV Push once  dextrose 50% Injectable 25 Gram(s) IV Push once  enoxaparin Injectable 40 milliGRAM(s) SubCutaneous daily  insulin lispro (HumaLOG) corrective regimen sliding scale   SubCutaneous three times a day before meals  insulin lispro (HumaLOG) corrective regimen sliding scale   SubCutaneous at bedtime  lactulose Retention Enema 200 Gram(s) Rectal once  lactulose Syrup 10 Gram(s) Oral two times a day  polyethylene glycol 3350 17 Gram(s) Oral daily  remdesivir  IVPB 100 milliGRAM(s) IV Intermittent every 24 hours  senna 1 Tablet(s) Oral daily    MEDICATIONS  (PRN):  acetaminophen   Tablet .. 650 milliGRAM(s) Oral every 6 hours PRN Temp greater or equal to 38C (100.4F), Moderate Pain (4 - 6)  dextrose 40% Gel 15 Gram(s) Oral once PRN Blood Glucose LESS THAN 70 milliGRAM(s)/deciliter  glucagon  Injectable 1 milliGRAM(s) IntraMuscular once PRN Glucose LESS THAN 70 milligrams/deciliter      Vital Signs Last 24 Hrs  T(C): 36.7 (12 Oct 2020 11:07), Max: 36.7 (11 Oct 2020 17:50)  T(F): 98 (12 Oct 2020 11:07), Max: 98.1 (11 Oct 2020 17:50)  HR: 68 (12 Oct 2020 11:07) (50 - 71)  BP: 96/63 (12 Oct 2020 11:07) (96/63 - 104/66)  BP(mean): --  RR: 17 (12 Oct 2020 11:07) (17 - 18)  SpO2: 95% (12 Oct 2020 11:07) (93% - 100%)    PHYSICAL EXAM:    GENERAL: NAD, well-groomed  HEAD:  Atraumatic, Normocephalic  EYES: EOMI, PERRLA, conjunctiva and sclera clear  ENMT: No tonsillar erythema, exudates, or enlargement; Moist mucous membranes  NECK: Supple, No JVD  CHEST/LUNG: Clear to percussion bilaterally; No rales, rhonchi, wheezing, or rubs  HEART: Regular rate and rhythm; No murmurs, rubs, or gallops  ABDOMEN: Soft, Nontender, Nondistended; Bowel sounds present  EXTREMITIES:  2+ Peripheral Pulses, No clubbing, cyanosis, or edema  LYMPH: No lymphadenopathy noted  SKIN: No rashes or lesions    LABS:  CBC Full  -  ( 12 Oct 2020 05:55 )  WBC Count : 10.87 K/uL  RBC Count : 4.75 M/uL  Hemoglobin : 13.5 g/dL  Hematocrit : 42.0 %  Platelet Count - Automated : 623 K/uL  Mean Cell Volume : 88.4 fL  Mean Cell Hemoglobin : 28.4 pg  Mean Cell Hemoglobin Concentration : 32.1 %  Auto Neutrophil # : x  Auto Lymphocyte # : x  Auto Monocyte # : x  Auto Eosinophil # : x  Auto Basophil # : x  Auto Neutrophil % : x  Auto Lymphocyte % : x  Auto Monocyte % : x  Auto Eosinophil % : x  Auto Basophil % : x      10-12    135  |  100  |  21  ----------------------------<  89  4.6   |  24  |  0.55    Ca    8.8      12 Oct 2020 05:55  Phos  3.9     10-12  Mg     2.2     10-12    TPro  6.3  /  Alb  3.2<L>  /  TBili  0.3  /  DBili  < 0.2  /  AST  14  /  ALT  10  /  AlkPhos  91  10-11      LIVER FUNCTIONS - ( 11 Oct 2020 06:00 )  Alb: 3.2 g/dL / Pro: 6.3 g/dL / ALK PHOS: 91 u/L / ALT: 10 u/L / AST: 14 u/L / GGT: x                               MICROBIOLOGY:                    RADIOLOGY:                 Patient is a 55y old  Female who presents with a chief complaint of SOB (11 Oct 2020 19:29)      HPI:  56 yo F with no significant PMH presented with SOB x 5 days, a/w loss of sense of smell and taste, nausea, and diarrhea. Patient has had diarrhea for about 2 days with 3 episodes of watery diarrhea today. Patient endorses nonproductive cough as well. She states she has been outside. Denies sick contacts but her  recently came down with a fever yesterday. Patient states she had the COVID antibody test done recently and was negative. Per ED documentation, pt went to PCP yesterday and was prescribed a Z-pack. Per EMS she was hypoxic to 80s on RA, improved to 93% on 6L NC.     On arrival to ED, patient was still satting >90s on NC but noted to be tachypneic, with increased WOB. She was placed on BIPAP. (03 Oct 2020 22:16)    HOSP COURSE:    Pt on remdesivir, dexamethasone and prophylactic lovenox.  Clinically improving, pt weaned off of bipap, and satting well on room air (as of 10/12).  Cxr clear.    ID Consult called for further recommendations.       REVIEW OF SYSTEMS:    CONSTITUTIONAL: No fever, weight loss, or fatigue  EYES: No eye pain, visual disturbances, or discharge  ENMT:  No sore throat  NECK: No pain or stiffness  RESPIRATORY: No cough, wheezing, chills or hemoptysis; No shortness of breath  CARDIOVASCULAR: No chest pain, palpitations, dizziness, or leg swelling  GASTROINTESTINAL: No abdominal or epigastric pain. No nausea, vomiting, or hematemesis; No diarrhea or constipation. No melena or hematochezia.  GENITOURINARY: No dysuria, frequency, hematuria, or incontinence  NEUROLOGICAL: No headaches, memory loss, loss of strength, numbness, or tremors  SKIN: No itching, burning, rashes, or lesions   LYMPH NODES: No enlarged glands  MUSCULOSKELETAL: No joint pain or swelling; No muscle, back, or extremity pain      PAST MEDICAL & SURGICAL HISTORY:  No pertinent past medical history    No significant past surgical history        Allergies    No Known Drug Allergies  Nuts (Hives)    Intolerances        FAMILY HISTORY:  No pertinent family history in first degree relatives        SOCIAL HISTORY:    Patient live with her , who began to have cough yesterday, otherwise no known sick contact. Patient has been outside. Patient denies any smoking, alcohol use or recreational drug use.    MEDICATIONS  (STANDING):  ALBUTerol    90 MICROgram(s) HFA Inhaler 1 Puff(s) Inhalation every 4 hours  dexAMETHasone  Injectable 6 milliGRAM(s) IV Push daily  dextrose 5%. 1000 milliLiter(s) (50 mL/Hr) IV Continuous <Continuous>  dextrose 50% Injectable 12.5 Gram(s) IV Push once  dextrose 50% Injectable 25 Gram(s) IV Push once  dextrose 50% Injectable 25 Gram(s) IV Push once  enoxaparin Injectable 40 milliGRAM(s) SubCutaneous daily  insulin lispro (HumaLOG) corrective regimen sliding scale   SubCutaneous three times a day before meals  insulin lispro (HumaLOG) corrective regimen sliding scale   SubCutaneous at bedtime  lactulose Retention Enema 200 Gram(s) Rectal once  lactulose Syrup 10 Gram(s) Oral two times a day  polyethylene glycol 3350 17 Gram(s) Oral daily  remdesivir  IVPB 100 milliGRAM(s) IV Intermittent every 24 hours  senna 1 Tablet(s) Oral daily    MEDICATIONS  (PRN):  acetaminophen   Tablet .. 650 milliGRAM(s) Oral every 6 hours PRN Temp greater or equal to 38C (100.4F), Moderate Pain (4 - 6)  dextrose 40% Gel 15 Gram(s) Oral once PRN Blood Glucose LESS THAN 70 milliGRAM(s)/deciliter  glucagon  Injectable 1 milliGRAM(s) IntraMuscular once PRN Glucose LESS THAN 70 milligrams/deciliter      Vital Signs Last 24 Hrs  T(C): 36.7 (12 Oct 2020 11:07), Max: 36.7 (11 Oct 2020 17:50)  T(F): 98 (12 Oct 2020 11:07), Max: 98.1 (11 Oct 2020 17:50)  HR: 68 (12 Oct 2020 11:07) (50 - 71)  BP: 96/63 (12 Oct 2020 11:07) (96/63 - 104/66)  BP(mean): --  RR: 17 (12 Oct 2020 11:07) (17 - 18)  SpO2: 95% (12 Oct 2020 11:07) (93% - 100%)    PHYSICAL EXAM:    GENERAL: NAD, well-groomed  HEAD:  Atraumatic, Normocephalic  EYES: EOMI, PERRLA, conjunctiva and sclera clear  ENMT: No tonsillar erythema, exudates, or enlargement; Moist mucous membranes  NECK: Supple, No JVD  CHEST/LUNG: Clear to percussion bilaterally; No rales, rhonchi, wheezing, or rubs  HEART: Regular rate and rhythm; No murmurs, rubs, or gallops  ABDOMEN: Soft, Nontender, Nondistended; Bowel sounds present  EXTREMITIES:  2+ Peripheral Pulses, No clubbing, cyanosis, or edema  LYMPH: No lymphadenopathy noted  SKIN: No rashes or lesions    LABS:  CBC Full  -  ( 12 Oct 2020 05:55 )  WBC Count : 10.87 K/uL  RBC Count : 4.75 M/uL  Hemoglobin : 13.5 g/dL  Hematocrit : 42.0 %  Platelet Count - Automated : 623 K/uL  Mean Cell Volume : 88.4 fL  Mean Cell Hemoglobin : 28.4 pg  Mean Cell Hemoglobin Concentration : 32.1 %  Auto Neutrophil # : x  Auto Lymphocyte # : x  Auto Monocyte # : x  Auto Eosinophil # : x  Auto Basophil # : x  Auto Neutrophil % : x  Auto Lymphocyte % : x  Auto Monocyte % : x  Auto Eosinophil % : x  Auto Basophil % : x      10-12    135  |  100  |  21  ----------------------------<  89  4.6   |  24  |  0.55    Ca    8.8      12 Oct 2020 05:55  Phos  3.9     10-12  Mg     2.2     10-12    TPro  6.3  /  Alb  3.2<L>  /  TBili  0.3  /  DBili  < 0.2  /  AST  14  /  ALT  10  /  AlkPhos  91  10-11      LIVER FUNCTIONS - ( 11 Oct 2020 06:00 )  Alb: 3.2 g/dL / Pro: 6.3 g/dL / ALK PHOS: 91 u/L / ALT: 10 u/L / AST: 14 u/L / GGT: x                               MICROBIOLOGY:        Urinalysis (10.03.20 @ 21:56)   Color: YELLOW   Urine Appearance: CLEAR   Glucose: NEGATIVE   Bilirubin: NEGATIVE   Ketone - Urine: MODERATE   Specific Gravity: 1.033   Blood: NEGATIVE   pH - Urine: 6.5   Protein, Urine: 300   Urobilinogen: NORMAL   Nitrite: NEGATIVE   Leukocyte Esterase Concentration: NEGATIVE   Red Blood Cell - Urine: 3-5   White Blood Cell - Urine: 3-5   Hyaline Casts: 1+   Bacteria: NEGATIVE   Squamous Epithelial: FEW         Culture - Urine (10.04.20 @ 02:24)   - Amikacin: S <=16   - Amoxicillin/Clavulanic Acid: S <=8/4   - Ampicillin: R >16 These ampicillin results predict results for amoxicillin   - Ampicillin/Sulbactam: I 16/8 Enterobacter, Citrobacter, and Serratia may develop resistance during prolonged therapy (3-4 days)   - Aztreonam: S <=4   - Cefazolin: S 4 (MIC_CL_COM_[id number]) For uncomplicated UTI with K. pneumoniae, E. coli, or P. mirablis: MERLENE <=16 is sensitive and MERLENE >=32 is resistant. This also predicts results for oral agents cefaclor, cefdinir, cefpodoxime, cefprozil, cefuroxime axetil, cephalexin and locarbef for uncomplicated UTI. Note that some isolates may be susceptible to these agents while testing resistant to cefazolin.   - Cefepime: S <=2   - Cefoxitin: S <=8   - Ceftriaxone: S <=1 Enterobacter, Citrobacter, and Serratia may develop resistance during prolonged therapy   - Ciprofloxacin: S <=0.25   - Ertapenem: S <=0.5   - Gentamicin: R >8   - Imipenem: S <=1   - Levofloxacin: S <=0.5   - Meropenem: S <=1   - Nitrofurantoin: S <=32 Should not be used to treat pyelonephritis   - Piperacillin/Tazobactam: S <=8   - Tigecycline: S <=2   - Tobramycin: R >8   - Trimethoprim/Sulfamethoxazole: R >2/38   Specimen Source: .Urine Clean Catch (Midstream)   Culture Results:   10,000 - 49,000 CFU/mL Escherichia coli   <10,000 CFU/ml Normal Urogenital karoline present   Organism Identification: Escherichia coli   Organism: Escherichia coli   Method Type: MERLENE           RADIOLOGY:      < from: Xray Chest 1 View- PORTABLE-Urgent (10.03.20 @ 16:05) >    EXAM:  XR CHEST PORTABLE URGENT 1V        PROCEDURE DATE:  Oct  3 2020         INTERPRETATION:  CLINICAL INFORMATION: Shortness of breath, cough, fever.    EXAM: Frontal radiograph of the chest.    COMPARISON: Chest radiograph from    FINDINGS:  Limited inspiration.  Right base linear opacity, left base patchy opacity.  There is no pleural effusion or pneumothorax.  The heart size is not well evaluated on this projection.  The visualized osseous structures demonstrate no acute pathology.    IMPRESSION:  Limited inspiration, right lower lung linear atelectasis, left lower lung atelectasis vs pneumonia.    < end of copied text >             Patient is a 55y old  Female who presents with a chief complaint of SOB (11 Oct 2020 19:29)      HPI:  56 yo F with no significant PMH presented with SOB x 5 days, a/w loss of sense of smell and taste, nausea, and diarrhea. Patient has had diarrhea for about 2 days with 3 episodes of watery diarrhea today. Patient endorses nonproductive cough as well. She states she has been outside. Denies sick contacts but her  recently came down with a fever yesterday. Patient states she had the COVID antibody test done recently and was negative. Per ED documentation, pt went to PCP yesterday and was prescribed a Z-pack. Per EMS she was hypoxic to 80s on RA, improved to 93% on 6L NC.     On arrival to ED, patient was still satting >90s on NC but noted to be tachypneic, with increased WOB. She was placed on BIPAP. (03 Oct 2020 22:16)    HOSP COURSE:    Pt on remdesivir, dexamethasone and prophylactic lovenox.  Clinically improving, pt weaned off of bipap, and satting well on room air (as of 10/12).  Cxr clear.    ID Consult called for further recommendations.       REVIEW OF SYSTEMS:    CONSTITUTIONAL: No fever, weight loss, or fatigue  EYES: No eye pain, visual disturbances, or discharge  ENMT:  No sore throat  NECK: No pain or stiffness  RESPIRATORY: No cough, wheezing, chills or hemoptysis; No shortness of breath  CARDIOVASCULAR: No chest pain, palpitations, dizziness, or leg swelling  GASTROINTESTINAL: No abdominal or epigastric pain. No nausea, vomiting, or hematemesis; No diarrhea or constipation. No melena or hematochezia.  GENITOURINARY: No dysuria, frequency, hematuria, or incontinence  NEUROLOGICAL: No headaches, memory loss, loss of strength, numbness, or tremors  SKIN: No itching, burning, rashes, or lesions   LYMPH NODES: No enlarged glands  MUSCULOSKELETAL: No joint pain or swelling; No muscle, back, or extremity pain      PAST MEDICAL & SURGICAL HISTORY:  No pertinent past medical history    No significant past surgical history        Allergies    No Known Drug Allergies  Nuts (Hives)    Intolerances        FAMILY HISTORY:  No pertinent family history in first degree relatives        SOCIAL HISTORY:    Patient live with her , who began to have cough yesterday, otherwise no known sick contact. Patient has been outside. Patient denies any smoking, alcohol use or recreational drug use.    MEDICATIONS  (STANDING):  ALBUTerol    90 MICROgram(s) HFA Inhaler 1 Puff(s) Inhalation every 4 hours  dexAMETHasone  Injectable 6 milliGRAM(s) IV Push daily  dextrose 5%. 1000 milliLiter(s) (50 mL/Hr) IV Continuous <Continuous>  dextrose 50% Injectable 12.5 Gram(s) IV Push once  dextrose 50% Injectable 25 Gram(s) IV Push once  dextrose 50% Injectable 25 Gram(s) IV Push once  enoxaparin Injectable 40 milliGRAM(s) SubCutaneous daily  insulin lispro (HumaLOG) corrective regimen sliding scale   SubCutaneous three times a day before meals  insulin lispro (HumaLOG) corrective regimen sliding scale   SubCutaneous at bedtime  lactulose Retention Enema 200 Gram(s) Rectal once  lactulose Syrup 10 Gram(s) Oral two times a day  polyethylene glycol 3350 17 Gram(s) Oral daily  remdesivir  IVPB 100 milliGRAM(s) IV Intermittent every 24 hours  senna 1 Tablet(s) Oral daily    MEDICATIONS  (PRN):  acetaminophen   Tablet .. 650 milliGRAM(s) Oral every 6 hours PRN Temp greater or equal to 38C (100.4F), Moderate Pain (4 - 6)  dextrose 40% Gel 15 Gram(s) Oral once PRN Blood Glucose LESS THAN 70 milliGRAM(s)/deciliter  glucagon  Injectable 1 milliGRAM(s) IntraMuscular once PRN Glucose LESS THAN 70 milligrams/deciliter      Vital Signs Last 24 Hrs  T(C): 36.7 (12 Oct 2020 11:07), Max: 36.7 (11 Oct 2020 17:50)  T(F): 98 (12 Oct 2020 11:07), Max: 98.1 (11 Oct 2020 17:50)  HR: 68 (12 Oct 2020 11:07) (50 - 71)  BP: 96/63 (12 Oct 2020 11:07) (96/63 - 104/66)  BP(mean): --  RR: 17 (12 Oct 2020 11:07) (17 - 18)  SpO2: 95% (12 Oct 2020 11:07) (93% - 100%)    PHYSICAL EXAM:    GENERAL: NAD, well-groomed  HEAD:  Atraumatic, Normocephalic  EYES: EOMI, PERRLA, conjunctiva and sclera clear  ENMT: No tonsillar erythema, exudates, or enlargement; Moist mucous membranes  NECK: Supple, No JVD  CHEST/LUNG: Clear to percussion bilaterally; No rales, rhonchi, wheezing, or rubs  HEART: Regular rate and rhythm; No murmurs, rubs, or gallops  ABDOMEN: Soft, Nontender, Nondistended; Bowel sounds present  EXTREMITIES:  2+ Peripheral Pulses, No clubbing, cyanosis, or edema  LYMPH: No lymphadenopathy noted  SKIN: No rashes or lesions    LABS:  CBC Full  -  ( 12 Oct 2020 05:55 )  WBC Count : 10.87 K/uL  RBC Count : 4.75 M/uL  Hemoglobin : 13.5 g/dL  Hematocrit : 42.0 %  Platelet Count - Automated : 623 K/uL  Mean Cell Volume : 88.4 fL  Mean Cell Hemoglobin : 28.4 pg  Mean Cell Hemoglobin Concentration : 32.1 %  Auto Neutrophil # : x  Auto Lymphocyte # : x  Auto Monocyte # : x  Auto Eosinophil # : x  Auto Basophil # : x  Auto Neutrophil % : x  Auto Lymphocyte % : x  Auto Monocyte % : x  Auto Eosinophil % : x  Auto Basophil % : x      10-12    135  |  100  |  21  ----------------------------<  89  4.6   |  24  |  0.55    Ca    8.8      12 Oct 2020 05:55  Phos  3.9     10-12  Mg     2.2     10-12    TPro  6.3  /  Alb  3.2<L>  /  TBili  0.3  /  DBili  < 0.2  /  AST  14  /  ALT  10  /  AlkPhos  91  10-11      LIVER FUNCTIONS - ( 11 Oct 2020 06:00 )  Alb: 3.2 g/dL / Pro: 6.3 g/dL / ALK PHOS: 91 u/L / ALT: 10 u/L / AST: 14 u/L / GGT: x                               MICROBIOLOGY:        Urinalysis (10.03.20 @ 21:56)   Color: YELLOW   Urine Appearance: CLEAR   Glucose: NEGATIVE   Bilirubin: NEGATIVE   Ketone - Urine: MODERATE   Specific Gravity: 1.033   Blood: NEGATIVE   pH - Urine: 6.5   Protein, Urine: 300   Urobilinogen: NORMAL   Nitrite: NEGATIVE   Leukocyte Esterase Concentration: NEGATIVE   Red Blood Cell - Urine: 3-5   White Blood Cell - Urine: 3-5   Hyaline Casts: 1+   Bacteria: NEGATIVE   Squamous Epithelial: FEW         Culture - Urine (10.04.20 @ 02:24)   - Amikacin: S <=16   - Amoxicillin/Clavulanic Acid: S <=8/4   - Ampicillin: R >16 These ampicillin results predict results for amoxicillin   - Ampicillin/Sulbactam: I 16/8 Enterobacter, Citrobacter, and Serratia may develop resistance during prolonged therapy (3-4 days)   - Aztreonam: S <=4   - Cefazolin: S 4 (MIC_CL_COM_ENTERIC_CEFAZU) For uncomplicated UTI with K. pneumoniae, E. coli, or P. mirablis: MERLENE <=16 is sensitive and MERLENE >=32 is resistant. This also predicts results for oral agents cefaclor, cefdinir, cefpodoxime, cefprozil, cefuroxime axetil, cephalexin and locarbef for uncomplicated UTI. Note that some isolates may be susceptible to these agents while testing resistant to cefazolin.   - Cefepime: S <=2   - Cefoxitin: S <=8   - Ceftriaxone: S <=1 Enterobacter, Citrobacter, and Serratia may develop resistance during prolonged therapy   - Ciprofloxacin: S <=0.25   - Ertapenem: S <=0.5   - Gentamicin: R >8   - Imipenem: S <=1   - Levofloxacin: S <=0.5   - Meropenem: S <=1   - Nitrofurantoin: S <=32 Should not be used to treat pyelonephritis   - Piperacillin/Tazobactam: S <=8   - Tigecycline: S <=2   - Tobramycin: R >8   - Trimethoprim/Sulfamethoxazole: R >2/38   Specimen Source: .Urine Clean Catch (Midstream)   Culture Results:   10,000 - 49,000 CFU/mL Escherichia coli   <10,000 CFU/ml Normal Urogenital karoline present   Organism Identification: Escherichia coli   Organism: Escherichia coli   Method Type: MERLENE       COVID-19 Antibody - for prior infection screening (10.08.20 @ 12:36)   COVID-19 IgG Antibody Index: 4.82: [employer] CMIA   Negative Result <= 1.39 Index   Positive Result >= 1.40 Index Index   COVID-19 IgG Antibody Interpretation: Positive: This test has not been reviewed by the FDA by the standard review   process. It has been authorized for emergency use by the FDA. [facility] has validated this test to be accurate.   Negative results do not rule out SARS-CoV-2 infection, particularly in   those who have been in recent contact with the virus. Follow-up testing   with a molecular diagnostic test should be considered to rule out   infection in these individuals.   Results from antibody testing should not be used as thesole basis to   diagnose or exclude SARS-CoV-2 infection, or to inform infection status.   Positive results may rarely be due to past or present infection with   non-SARS-CoV-2 coronavirus strains, such as coronavirus HKU1, NL63, OC43,   or 229E. [facility], through extensive validation   testing, has confirmed that this risk is minimal with this test.       Respiratory Viral Panel + COVID-19 by [facility] (10.03.20 @ 16:00)   Rapid RVP Result: Detected   SARS-CoV-2: Detected: This Respiratory Panel uses polymerase chain reaction (PCR) to detect for   adenovirus; coronavirus (HKU1, NL63, 229E, OC43); human metapneumovirus   (hMPV); human enterovirus/rhinovirus (Entero/RV); influenza A; influenza   A/H1; influenza A/H3; influenza A/H1-2009; influenza B; parainfluenza   viruses 1, 2, 3, 4; respiratory syncytial virus; Mycoplasma pneumoniae;   Chlamydophila pneumoniae; and SARS-CoV-2.   Adenovirus (RapRVP): NotDetec   Influenza A (RapRVP): NotDetec   Influenza AH1 2009 (RapRVP): NotDetec   Influenza AH1 (RapRVP): NotDetec   Influenza AH3 (RapRVP): NotDetec   Influenza B (RapRVP): NotDetec   Parainfluenza 1 (RapRVP): NotDetec   Parainfluenza 2 (RapRVP): NotDetec   Parainfluenza 3 (RapRVP): NotDetec   Parainfluenza 4 (RapRVP): NotDetec   Chlamydia pneumoniae (RapRVP): NotDetec   Mycoplasma pneumoniae (RapRVP): NotDetec   Entero/Rhinovirus (RapRVP): NotDetec   hMPV (RapRVP): NotDetec   Coronavirus (229E,HKU1,NL63,OC43): NotDetec     RADIOLOGY:      < from: Xray Chest 1 View- PORTABLE-Urgent (10.03.20 @ 16:05) >    EXAM:  XR CHEST PORTABLE URGENT 1V        PROCEDURE DATE:  Oct  3 2020         INTERPRETATION:  CLINICAL INFORMATION: Shortness of breath, cough, fever.    EXAM: Frontal radiograph of the chest.    COMPARISON: Chest radiograph from    FINDINGS:  Limited inspiration.  Right base linear opacity, left base patchy opacity.  There is no pleural effusion or pneumothorax.  The heart size is not well evaluated on this projection.  The visualized osseous structures demonstrate no acute pathology.    IMPRESSION:  Limited inspiration, right lower lung linear atelectasis, left lower lung atelectasis vs pneumonia.    < end of copied text >

## 2020-10-12 NOTE — PHYSICAL THERAPY INITIAL EVALUATION ADULT - GENERAL OBSERVATIONS, REHAB EVAL
Consult received, chart reviewed. Patient received semisupine in bed, no apparent distress, +pulse ox, +high flow NC.

## 2020-10-13 LAB
ALBUMIN SERPL ELPH-MCNC: 3 G/DL — LOW (ref 3.3–5)
ALBUMIN SERPL ELPH-MCNC: 3.1 G/DL — LOW (ref 3.3–5)
ALP SERPL-CCNC: 96 U/L — SIGNIFICANT CHANGE UP (ref 40–120)
ALP SERPL-CCNC: 98 U/L — SIGNIFICANT CHANGE UP (ref 40–120)
ALT FLD-CCNC: 10 U/L — SIGNIFICANT CHANGE UP (ref 4–33)
ALT FLD-CCNC: 8 U/L — SIGNIFICANT CHANGE UP (ref 4–33)
ANION GAP SERPL CALC-SCNC: 7 MMO/L — SIGNIFICANT CHANGE UP (ref 7–14)
AST SERPL-CCNC: 13 U/L — SIGNIFICANT CHANGE UP (ref 4–32)
AST SERPL-CCNC: 14 U/L — SIGNIFICANT CHANGE UP (ref 4–32)
BILIRUB DIRECT SERPL-MCNC: < 0.2 MG/DL — SIGNIFICANT CHANGE UP (ref 0.1–0.2)
BILIRUB SERPL-MCNC: 0.3 MG/DL — SIGNIFICANT CHANGE UP (ref 0.2–1.2)
BILIRUB SERPL-MCNC: 0.3 MG/DL — SIGNIFICANT CHANGE UP (ref 0.2–1.2)
BUN SERPL-MCNC: 20 MG/DL — SIGNIFICANT CHANGE UP (ref 7–23)
CALCIUM SERPL-MCNC: 8.9 MG/DL — SIGNIFICANT CHANGE UP (ref 8.4–10.5)
CHLORIDE SERPL-SCNC: 101 MMOL/L — SIGNIFICANT CHANGE UP (ref 98–107)
CO2 SERPL-SCNC: 26 MMOL/L — SIGNIFICANT CHANGE UP (ref 22–31)
CREAT SERPL-MCNC: 0.58 MG/DL — SIGNIFICANT CHANGE UP (ref 0.5–1.3)
CREAT SERPL-MCNC: 0.59 MG/DL — SIGNIFICANT CHANGE UP (ref 0.5–1.3)
CRP SERPL HS-MCNC: 1.87 MG/L — SIGNIFICANT CHANGE UP
D DIMER BLD IA.RAPID-MCNC: 233 NG/ML — SIGNIFICANT CHANGE UP
FIBRINOGEN PPP-MCNC: 631 MG/DL — HIGH (ref 290–520)
GLUCOSE BLDC GLUCOMTR-MCNC: 117 MG/DL — HIGH (ref 70–99)
GLUCOSE BLDC GLUCOMTR-MCNC: 129 MG/DL — HIGH (ref 70–99)
GLUCOSE BLDC GLUCOMTR-MCNC: 233 MG/DL — HIGH (ref 70–99)
GLUCOSE BLDC GLUCOMTR-MCNC: 94 MG/DL — SIGNIFICANT CHANGE UP (ref 70–99)
GLUCOSE BLDC GLUCOMTR-MCNC: 99 MG/DL — SIGNIFICANT CHANGE UP (ref 70–99)
GLUCOSE SERPL-MCNC: 93 MG/DL — SIGNIFICANT CHANGE UP (ref 70–99)
HCT VFR BLD CALC: 44.5 % — SIGNIFICANT CHANGE UP (ref 34.5–45)
HGB BLD-MCNC: 14 G/DL — SIGNIFICANT CHANGE UP (ref 11.5–15.5)
LDH SERPL L TO P-CCNC: 180 U/L — SIGNIFICANT CHANGE UP (ref 135–225)
MAGNESIUM SERPL-MCNC: 2.2 MG/DL — SIGNIFICANT CHANGE UP (ref 1.6–2.6)
MCHC RBC-ENTMCNC: 28.2 PG — SIGNIFICANT CHANGE UP (ref 27–34)
MCHC RBC-ENTMCNC: 31.5 % — LOW (ref 32–36)
MCV RBC AUTO: 89.5 FL — SIGNIFICANT CHANGE UP (ref 80–100)
NRBC # FLD: 0 K/UL — SIGNIFICANT CHANGE UP (ref 0–0)
PHOSPHATE SERPL-MCNC: 3.5 MG/DL — SIGNIFICANT CHANGE UP (ref 2.5–4.5)
PLATELET # BLD AUTO: 628 K/UL — HIGH (ref 150–400)
PMV BLD: 8.5 FL — SIGNIFICANT CHANGE UP (ref 7–13)
POTASSIUM SERPL-MCNC: 4.4 MMOL/L — SIGNIFICANT CHANGE UP (ref 3.5–5.3)
POTASSIUM SERPL-SCNC: 4.4 MMOL/L — SIGNIFICANT CHANGE UP (ref 3.5–5.3)
PROCALCITONIN SERPL-MCNC: 0.04 NG/ML — SIGNIFICANT CHANGE UP (ref 0.02–0.1)
PROT SERPL-MCNC: 6.2 G/DL — SIGNIFICANT CHANGE UP (ref 6–8.3)
PROT SERPL-MCNC: 6.3 G/DL — SIGNIFICANT CHANGE UP (ref 6–8.3)
RBC # BLD: 4.97 M/UL — SIGNIFICANT CHANGE UP (ref 3.8–5.2)
RBC # FLD: 13.2 % — SIGNIFICANT CHANGE UP (ref 10.3–14.5)
SODIUM SERPL-SCNC: 134 MMOL/L — LOW (ref 135–145)
WBC # BLD: 12.23 K/UL — HIGH (ref 3.8–10.5)
WBC # FLD AUTO: 12.23 K/UL — HIGH (ref 3.8–10.5)

## 2020-10-13 RX ADMIN — POLYETHYLENE GLYCOL 3350 17 GRAM(S): 17 POWDER, FOR SOLUTION ORAL at 11:52

## 2020-10-13 RX ADMIN — Medication 650 MILLIGRAM(S): at 14:37

## 2020-10-13 RX ADMIN — LACTULOSE 10 GRAM(S): 10 SOLUTION ORAL at 17:13

## 2020-10-13 RX ADMIN — Medication 2: at 11:53

## 2020-10-13 RX ADMIN — ALBUTEROL 1 PUFF(S): 90 AEROSOL, METERED ORAL at 01:47

## 2020-10-13 RX ADMIN — ALBUTEROL 1 PUFF(S): 90 AEROSOL, METERED ORAL at 22:28

## 2020-10-13 RX ADMIN — ALBUTEROL 1 PUFF(S): 90 AEROSOL, METERED ORAL at 11:52

## 2020-10-13 RX ADMIN — ALBUTEROL 1 PUFF(S): 90 AEROSOL, METERED ORAL at 17:14

## 2020-10-13 RX ADMIN — ENOXAPARIN SODIUM 40 MILLIGRAM(S): 100 INJECTION SUBCUTANEOUS at 11:52

## 2020-10-13 RX ADMIN — Medication 650 MILLIGRAM(S): at 15:22

## 2020-10-13 RX ADMIN — ALBUTEROL 1 PUFF(S): 90 AEROSOL, METERED ORAL at 06:06

## 2020-10-13 RX ADMIN — SENNA PLUS 1 TABLET(S): 8.6 TABLET ORAL at 11:52

## 2020-10-13 RX ADMIN — Medication 6 MILLIGRAM(S): at 06:06

## 2020-10-13 NOTE — PROGRESS NOTE ADULT - ASSESSMENT
54 yo F without PMH presents to ED with 5 days of fever, cough, shortness of breath found hypoxic in ED requiring bipap and with +COVID-19 to be admitted for further management.     cornovairus pneumonia with hypoxic resp failure:  On remdesivir:  On dexa :  DVT prophylaxis    she is still o n antiviral meds:? remdesivir restarted?reason extended course:   Clinically she looks OK: doubt any superadded bacterial infection:  on dvt prophylaxis  try to wean the oxygen to nasal cannula:   d di elinor has increased a little but for few sdyas ago: but she hasno change in resp symtpoms:  CRP is increased too: follow inflmmatory markers:   ID to see today :   ASHLEY KHOURY      10/12:  she seems to be doing pretty good:  her oxygenation is better too:  would change to nasal cannula:  on anti viral as well as steroids:  She is totally symptomatic  dvt prophylaxis  hopefully will cont to improve : dc planning in 1- 2 days:  DW NP    10/13:  seems to be doign ok : no SOB : no cough :   on 4 L of oxygen ; try to weanoff oxygen : she has no resp symtpoms: feels pretty good:   remdesivir finished:  omn dexa:  dw np

## 2020-10-13 NOTE — PROGRESS NOTE ADULT - ASSESSMENT
54 yo F without PMH presents to ED with 5 days of fever, cough, shortness of breath found hypoxic in ED requiring bipap and with +COVID-19    Problem/Plan - 1:  ·  Problem: Acute hypoxemic respiratory failure.  Plan: Likely 2/2 COVID, currently on HFNC   -continue remdesivir for extended course (10/3-12) and decadron (10/3-)  -monitor cont. pulse ox. wean off HFNC as tolerated  -albuterol as needed.     Problem/Plan - 2:  ·  Problem: 2019 novel coronavirus disease (COVID-19).  Plan: COVID PCR positive on 10/3  - transitioning to high flow as tolerated-continue remdesivir (10/3-12) and decadron (10/3-)  - Monitor daily renal and hepatic function while on Remdesivir   - Lovenox for DVT ppx- BMI 28  - Trend biomarkers.     Problem/Plan - 3:·  Problem: Leukocytosis, unspecified type.  Plan: - leuokocytosis due to steroids- Procalcitonin mildly elevated, low suspicion for superimposed bacterial PNA- Continue to trend/monitor for fevers.     Problem/Plan - 4:  ·  Problem: Prophylactic measure.  Plan: - DVT: lovenox. ·  Problem: Leukocytosis, unspecified type.  Plan: - leuokocytosis due to steroids- Procalcitonin mildly elevated, low suspicion for superimposed bacterial PNA  - Continue to trend/monitor for fevers.     Problem/Plan - 4:  ·  Problem: Prophylactic measure.  Plan: - DVT: lovenox.

## 2020-10-13 NOTE — PROGRESS NOTE ADULT - SUBJECTIVE AND OBJECTIVE BOX
Patient is a 55y old  Female who presents with a chief complaint of SOB (12 Oct 2020 17:56)      Any change in ROS: Doingok : no SOB     MEDICATIONS  (STANDING):  ALBUTerol    90 MICROgram(s) HFA Inhaler 1 Puff(s) Inhalation every 4 hours  bisacodyl Suppository 10 milliGRAM(s) Rectal once  dexAMETHasone  Injectable 6 milliGRAM(s) IV Push daily  dextrose 5%. 1000 milliLiter(s) (50 mL/Hr) IV Continuous <Continuous>  dextrose 50% Injectable 25 Gram(s) IV Push once  dextrose 50% Injectable 25 Gram(s) IV Push once  dextrose 50% Injectable 12.5 Gram(s) IV Push once  enoxaparin Injectable 40 milliGRAM(s) SubCutaneous daily  insulin lispro (HumaLOG) corrective regimen sliding scale   SubCutaneous three times a day before meals  insulin lispro (HumaLOG) corrective regimen sliding scale   SubCutaneous at bedtime  lactulose Syrup 10 Gram(s) Oral two times a day  polyethylene glycol 3350 17 Gram(s) Oral daily  senna 1 Tablet(s) Oral daily    MEDICATIONS  (PRN):  acetaminophen   Tablet .. 650 milliGRAM(s) Oral every 6 hours PRN Temp greater or equal to 38C (100.4F), Moderate Pain (4 - 6)  dextrose 40% Gel 15 Gram(s) Oral once PRN Blood Glucose LESS THAN 70 milliGRAM(s)/deciliter  glucagon  Injectable 1 milliGRAM(s) IntraMuscular once PRN Glucose LESS THAN 70 milligrams/deciliter    Vital Signs Last 24 Hrs  T(C): 36.7 (13 Oct 2020 10:21), Max: 37 (12 Oct 2020 22:45)  T(F): 98.1 (13 Oct 2020 10:21), Max: 98.6 (12 Oct 2020 22:45)  HR: 81 (13 Oct 2020 10:21) (57 - 81)  BP: 96/58 (13 Oct 2020 10:21) (86/52 - 109/63)  BP(mean): --  RR: 18 (13 Oct 2020 10:21) (17 - 19)  SpO2: 96% (13 Oct 2020 10:21) (95% - 100%)    I&O's Summary    12 Oct 2020 07:01  -  13 Oct 2020 07:00  --------------------------------------------------------  IN: 490 mL / OUT: 900 mL / NET: -410 mL          Physical Exam:   GENERAL: NAD, well-groomed, well-developed  HEENT: COLLIN/   Atraumatic, Normocephalic  ENMT: No tonsillar erythema, exudates, or enlargement; Moist mucous membranes, Good dentition, No lesions  NECK: Supple, No JVD, Normal thyroid  CHEST/LUNG: Clear to auscultaions  CVS: Regular rate and rhythm; No murmurs, rubs, or gallops  GI: : Soft, Nontender, Nondistended; Bowel sounds present  NERVOUS SYSTEM:  Alert & Oriented X3  EXTREMITIES:  2+ Peripheral Pulses, No clubbing, cyanosis, or edema  LYMPH: No lymphadenopathy noted  SKIN: No rashes or lesions  ENDOCRINOLOGY: No Thyromegaly  PSYCH: Appropriate    Labs:                              14.0   12.23 )-----------( 628      ( 13 Oct 2020 07:01 )             44.5                         13.5   10.87 )-----------( 623      ( 12 Oct 2020 05:55 )             42.0                         13.7   11.05 )-----------( 649      ( 11 Oct 2020 06:00 )             42.9                         13.9   10.67 )-----------( 619      ( 10 Oct 2020 07:50 )             43.5     10-13    134<L>  |  101  |  20  ----------------------------<  93  4.4   |  26  |  0.59  10-12    135  |  100  |  21  ----------------------------<  89  4.6   |  24  |  0.55  10-11    134<L>  |  101  |  21  ----------------------------<  94  4.6   |  25  |  0.55  10-10    136  |  101  |  19  ----------------------------<  95  4.5   |  25  |  0.55    Ca    8.9      13 Oct 2020 07:01  Ca    8.8      12 Oct 2020 05:55  Phos  3.5     10-13  Phos  3.9     10-12  Mg     2.2     10-13  Mg     2.2     10-12    TPro  6.3  /  Alb  3.1<L>  /  TBili  0.3  /  DBili  < 0.2  /  AST  13  /  ALT  8   /  AlkPhos  98  10-13  TPro  6.3  /  Alb  3.2<L>  /  TBili  0.3  /  DBili  < 0.2  /  AST  14  /  ALT  10  /  AlkPhos  91  10-11  TPro  6.7  /  Alb  3.1<L>  /  TBili  0.3  /  DBili  < 0.2  /  AST  16  /  ALT  13  /  AlkPhos  96  10-10    CAPILLARY BLOOD GLUCOSE      POCT Blood Glucose.: 233 mg/dL (13 Oct 2020 11:19)  POCT Blood Glucose.: 99 mg/dL (13 Oct 2020 07:34)  POCT Blood Glucose.: 94 mg/dL (13 Oct 2020 06:12)  POCT Blood Glucose.: 186 mg/dL (12 Oct 2020 22:45)  POCT Blood Glucose.: 107 mg/dL (12 Oct 2020 16:42)      LIVER FUNCTIONS - ( 13 Oct 2020 07:01 )  Alb: 3.1 g/dL / Pro: 6.3 g/dL / ALK PHOS: 98 u/L / ALT: 8 u/L / AST: 13 u/L / GGT: x               D-Dimer Assay, Quantitative: 233 ng/mL (10-13 @ 07:01)  D-Dimer Assay, Quantitative: 154 ng/mL (10-07 @ 06:50)  Procalcitonin, Serum: 0.04 ng/mL (10-13 @ 07:01)      rad< from: Xray Chest 1 View- PORTABLE-Urgent (10.03.20 @ 16:05) >  DURE DATE:  Oct  3 2020         INTERPRETATION:  CLINICAL INFORMATION: Shortness of breath, cough, fever.    EXAM: Frontal radiograph of the chest.    COMPARISON: Chest radiograph from    FINDINGS:  Limited inspiration.  Right base linear opacity, left base patchy opacity.  There is no pleural effusion or pneumothorax.  The heart size is not well evaluated on this projection.  The visualized osseous structures demonstrate no acute pathology.    IMPRESSION:  Limited inspiration, right lower lung linear atelectasis, left lower lung atelectasis vs pneumonia.            AIDEN FREEDMAN M.D., RADIOLOGY RESIDENT  This document has been electronically signed.  JUAN CARLOS KENT M.D., ATTENDING RADIOLOGIST  This document has been electronically signed. Oct  4 2020  8:02AM    < end of copied text >    RECENT CULTURES:        RESPIRATORY CULTURES:          Studies  Chest X-RAY  CT SCAN Chest   Venous Dopplers: LE:   CT Abdomen  Others

## 2020-10-13 NOTE — PROGRESS NOTE ADULT - SUBJECTIVE AND OBJECTIVE BOX
Patient is a 55y old  Female who presents with a chief complaint of SOB (13 Oct 2020 18:35)      INTERVAL HPI/OVERNIGHT EVENTS:  T(C): 36.7 (10-13-20 @ 17:54), Max: 37 (10-12-20 @ 22:45)  HR: 62 (10-13-20 @ 17:54) (57 - 81)  BP: 99/57 (10-13-20 @ 17:54) (86/52 - 100/59)  RR: 19 (10-13-20 @ 17:54) (17 - 19)  SpO2: 96% (10-13-20 @ 17:54) (96% - 100%)  Wt(kg): --  I&O's Summary    12 Oct 2020 07:01  -  13 Oct 2020 07:00  --------------------------------------------------------  IN: 490 mL / OUT: 900 mL / NET: -410 mL    13 Oct 2020 07:01  -  13 Oct 2020 19:30  --------------------------------------------------------  IN: 600 mL / OUT: 0 mL / NET: 600 mL        LABS:                        14.0   12.23 )-----------( 628      ( 13 Oct 2020 07:01 )             44.5     10-13    134<L>  |  101  |  20  ----------------------------<  93  4.4   |  26  |  0.59    Ca    8.9      13 Oct 2020 07:01  Phos  3.5     10-13  Mg     2.2     10-13    TPro  6.3  /  Alb  3.1<L>  /  TBili  0.3  /  DBili  < 0.2  /  AST  13  /  ALT  8   /  AlkPhos  98  10-13        CAPILLARY BLOOD GLUCOSE      POCT Blood Glucose.: 117 mg/dL (13 Oct 2020 16:50)  POCT Blood Glucose.: 233 mg/dL (13 Oct 2020 11:19)  POCT Blood Glucose.: 99 mg/dL (13 Oct 2020 07:34)  POCT Blood Glucose.: 94 mg/dL (13 Oct 2020 06:12)  POCT Blood Glucose.: 186 mg/dL (12 Oct 2020 22:45)            MEDICATIONS  (STANDING):  ALBUTerol    90 MICROgram(s) HFA Inhaler 1 Puff(s) Inhalation every 4 hours  bisacodyl Suppository 10 milliGRAM(s) Rectal once  dexAMETHasone  Injectable 6 milliGRAM(s) IV Push daily  dextrose 5%. 1000 milliLiter(s) (50 mL/Hr) IV Continuous <Continuous>  dextrose 50% Injectable 12.5 Gram(s) IV Push once  dextrose 50% Injectable 25 Gram(s) IV Push once  dextrose 50% Injectable 25 Gram(s) IV Push once  enoxaparin Injectable 40 milliGRAM(s) SubCutaneous daily  insulin lispro (HumaLOG) corrective regimen sliding scale   SubCutaneous three times a day before meals  insulin lispro (HumaLOG) corrective regimen sliding scale   SubCutaneous at bedtime  lactulose Syrup 10 Gram(s) Oral two times a day  polyethylene glycol 3350 17 Gram(s) Oral daily  senna 1 Tablet(s) Oral daily    MEDICATIONS  (PRN):  acetaminophen   Tablet .. 650 milliGRAM(s) Oral every 6 hours PRN Temp greater or equal to 38C (100.4F), Moderate Pain (4 - 6)  dextrose 40% Gel 15 Gram(s) Oral once PRN Blood Glucose LESS THAN 70 milliGRAM(s)/deciliter  glucagon  Injectable 1 milliGRAM(s) IntraMuscular once PRN Glucose LESS THAN 70 milligrams/deciliter          PHYSICAL EXAM:  GENERAL: NAD, well-groomed, well-developed  HEAD:  Atraumatic, Normocephalic  CHEST/LUNG: Clear to percussion bilaterally; No rales, rhonchi, wheezing, or rubs  HEART: Regular rate and rhythm; No murmurs, rubs, or gallops  ABDOMEN: Soft, Nontender, Nondistended; Bowel sounds present  EXTREMITIES:  2+ Peripheral Pulses, No clubbing, cyanosis, or edema  LYMPH: No lymphadenopathy noted  SKIN: No rashes or lesions    Care Discussed with Consultants/Other Providers [x ] YES  [ ] NO

## 2020-10-13 NOTE — PROGRESS NOTE ADULT - ASSESSMENT
54 yo F with no significant PMH presented with SOB x 5 days, a/w loss of sense of smell and taste, nausea, and diarrhea. Patient has had diarrhea for about 2 days with 3 episodes of watery diarrhea today. Patient endorses nonproductive cough as well. She states she has been outside. Denies sick contacts but her  recently came down with a fever yesterday. Patient states she had the COVID antibody test done recently and was negative. Per ED documentation, pt went to PCP yesterday and was prescribed a Z-pack. Per EMS she was hypoxic to 80s on RA, improved to 93% on 6L NC.     On arrival to ED, patient was still satting >90s on NC but noted to be tachypneic, with increased WOB. She was placed on BIPAP. (03 Oct 2020 22:16)    HOSP COURSE:    Pt on remdesivir, dexamethasone and prophylactic lovenox.  Clinically improving, pt weaned off of bipap, and satting well on room air (as of 10/12).  Cxr clear.    ID Consult called for further recommendations.       Covid-19 with acute hypoxic resp failure:    - Pt with initial hypoxia to 80s on RA, and tachypneic at presentation, requiring Bipap for increased WOB.  Pt seen by [facility], deemed not a candidate.  Clinical findings likely secondary to Covid-19 pna.  Cov pcr and Abs (+).      - Pt on extended course of remedesivir as per primary team.  Also on dexamethasone IV.  Complete 10 day course and observe off.  DD is elevated, pt remains on prophylactic lovenox.  Satting adequately on NC.  Wean off O2 as tolerated.     - No evidence for superimposed bacterial pna on cxr.  No productive cough or fever.  No indication for abx at this time.     CRP: 10 <-- 263  DD:  233<-- 154 <--  less than 150  Ferritin: 165 <-- 328  PCT:  0.04 <-- 0.15 <-- 0.68      Asymptomatic bacteruria:    - UA (neg), Ucx < 50K E.coli, likely contaminant.  Pt w/o urinary symptoms.  No indication for abx.       [facility]  [phone]

## 2020-10-13 NOTE — PROGRESS NOTE ADULT - SUBJECTIVE AND OBJECTIVE BOX
Infectious Diseases progress note:    Subjective: NAD, s/p completion of remdesivir.  Pt remains on dexamethasone.  Afebrile, satting adequately on 2L nasal cannula.     ROS:  CONSTITUTIONAL:  No fever, chills, rigors  CARDIOVASCULAR:  No chest pain or palpitations  RESPIRATORY:   No SOB, cough, dyspnea on exertion.  No wheezing  GASTROINTESTINAL:  No abd pain, N/V, diarrhea/constipation  EXTREMITIES:  No swelling or joint pain  GENITOURINARY:  No burning on urination, increased frequency or urgency.  No flank pain  NEUROLOGIC:  No HA, visual disturbances  SKIN: No rashes    Allergies    No Known Drug Allergies  Nuts (Hives)    Intolerances        ANTIBIOTICS/RELEVANT:  antimicrobials    immunologic:    OTHER:  acetaminophen   Tablet .. 650 milliGRAM(s) Oral every 6 hours PRN  ALBUTerol    90 MICROgram(s) HFA Inhaler 1 Puff(s) Inhalation every 4 hours  bisacodyl Suppository 10 milliGRAM(s) Rectal once  dexAMETHasone  Injectable 6 milliGRAM(s) IV Push daily  dextrose 40% Gel 15 Gram(s) Oral once PRN  dextrose 5%. 1000 milliLiter(s) IV Continuous <Continuous>  dextrose 50% Injectable 12.5 Gram(s) IV Push once  dextrose 50% Injectable 25 Gram(s) IV Push once  dextrose 50% Injectable 25 Gram(s) IV Push once  enoxaparin Injectable 40 milliGRAM(s) SubCutaneous daily  glucagon  Injectable 1 milliGRAM(s) IntraMuscular once PRN  insulin lispro (HumaLOG) corrective regimen sliding scale   SubCutaneous three times a day before meals  insulin lispro (HumaLOG) corrective regimen sliding scale   SubCutaneous at bedtime  lactulose Syrup 10 Gram(s) Oral two times a day  polyethylene glycol 3350 17 Gram(s) Oral daily  senna 1 Tablet(s) Oral daily      Objective:  Vital Signs Last 24 Hrs  T(C): 36.7 (13 Oct 2020 17:54), Max: 37 (12 Oct 2020 22:45)  T(F): 98.1 (13 Oct 2020 17:54), Max: 98.6 (12 Oct 2020 22:45)  HR: 62 (13 Oct 2020 17:54) (57 - 81)  BP: 99/57 (13 Oct 2020 17:54) (86/52 - 100/59)  BP(mean): --  RR: 19 (13 Oct 2020 17:54) (17 - 19)  SpO2: 96% (13 Oct 2020 17:54) (96% - 100%)    PHYSICAL EXAM:  Constitutional:NAD  Eyes:COLLIN, EOMI  Ear/Nose/Throat: no thrush, mucositis.  Moist mucous membranes	  Neck:no JVD, no lymphadenopathy, supple  Respiratory: CTA jeff  Cardiovascular: S1S2 RRR, no murmurs  Gastrointestinal:soft, nontender,  nondistended (+) BS  Extremities:no e/e/c  Skin:  no rashes, open wounds or ulcerations        LABS:                        14.0   12.23 )-----------( 628      ( 13 Oct 2020 07:01 )             44.5     10-13    134<L>  |  101  |  20  ----------------------------<  93  4.4   |  26  |  0.59    Ca    8.9      13 Oct 2020 07:01  Phos  3.5     10-13  Mg     2.2     10-13    TPro  6.3  /  Alb  3.1<L>  /  TBili  0.3  /  DBili  < 0.2  /  AST  13  /  ALT  8   /  AlkPhos  98  10-13          Procalcitonin, Serum: 0.04 (10-13 @ 07:01)  Procalcitonin, Serum: 0.15 (10-07 @ 06:50)  Procalcitonin, Serum: 0.68 (10-04 @ 06:50)  Procalcitonin, Serum: 0.75 (10-03 @ 15:30)            Rapid RVP Result: Detected          MICROBIOLOGY:          RADIOLOGY & ADDITIONAL STUDIES:

## 2020-10-14 LAB
GLUCOSE BLDC GLUCOMTR-MCNC: 116 MG/DL — HIGH (ref 70–99)
GLUCOSE BLDC GLUCOMTR-MCNC: 121 MG/DL — HIGH (ref 70–99)
GLUCOSE BLDC GLUCOMTR-MCNC: 132 MG/DL — HIGH (ref 70–99)
GLUCOSE BLDC GLUCOMTR-MCNC: 95 MG/DL — SIGNIFICANT CHANGE UP (ref 70–99)

## 2020-10-14 RX ORDER — SENNA PLUS 8.6 MG/1
1 TABLET ORAL
Qty: 0 | Refills: 0 | DISCHARGE
Start: 2020-10-14

## 2020-10-14 RX ORDER — POLYETHYLENE GLYCOL 3350 17 G/17G
17 POWDER, FOR SOLUTION ORAL
Qty: 0 | Refills: 0 | DISCHARGE
Start: 2020-10-14

## 2020-10-14 RX ORDER — ACETAMINOPHEN 500 MG
2 TABLET ORAL
Qty: 0 | Refills: 0 | DISCHARGE
Start: 2020-10-14

## 2020-10-14 RX ADMIN — ALBUTEROL 1 PUFF(S): 90 AEROSOL, METERED ORAL at 05:58

## 2020-10-14 RX ADMIN — ALBUTEROL 1 PUFF(S): 90 AEROSOL, METERED ORAL at 22:48

## 2020-10-14 RX ADMIN — SENNA PLUS 1 TABLET(S): 8.6 TABLET ORAL at 12:02

## 2020-10-14 RX ADMIN — POLYETHYLENE GLYCOL 3350 17 GRAM(S): 17 POWDER, FOR SOLUTION ORAL at 12:02

## 2020-10-14 RX ADMIN — LACTULOSE 10 GRAM(S): 10 SOLUTION ORAL at 17:15

## 2020-10-14 RX ADMIN — ALBUTEROL 1 PUFF(S): 90 AEROSOL, METERED ORAL at 15:15

## 2020-10-14 RX ADMIN — ENOXAPARIN SODIUM 40 MILLIGRAM(S): 100 INJECTION SUBCUTANEOUS at 12:02

## 2020-10-14 RX ADMIN — Medication 6 MILLIGRAM(S): at 05:58

## 2020-10-14 RX ADMIN — ALBUTEROL 1 PUFF(S): 90 AEROSOL, METERED ORAL at 01:58

## 2020-10-14 RX ADMIN — ALBUTEROL 1 PUFF(S): 90 AEROSOL, METERED ORAL at 12:03

## 2020-10-14 NOTE — PROGRESS NOTE ADULT - SUBJECTIVE AND OBJECTIVE BOX
Infectious Diseases progress note:    Subjective: NAD, afebrile.  Satting 95% on 2L nc.  No new labs today.     ROS:  CONSTITUTIONAL:  No fever, chills, rigors  CARDIOVASCULAR:  No chest pain or palpitations  RESPIRATORY:   No SOB, cough, dyspnea on exertion.  No wheezing  GASTROINTESTINAL:  No abd pain, N/V, diarrhea/constipation  EXTREMITIES:  No swelling or joint pain  GENITOURINARY:  No burning on urination, increased frequency or urgency.  No flank pain  NEUROLOGIC:  No HA, visual disturbances  SKIN: No rashes    Allergies    No Known Drug Allergies  Nuts (Hives)    Intolerances        ANTIBIOTICS/RELEVANT:  antimicrobials    immunologic:    OTHER:  acetaminophen   Tablet .. 650 milliGRAM(s) Oral every 6 hours PRN  ALBUTerol    90 MICROgram(s) HFA Inhaler 1 Puff(s) Inhalation every 4 hours  bisacodyl Suppository 10 milliGRAM(s) Rectal once  dexAMETHasone  Injectable 6 milliGRAM(s) IV Push daily  dextrose 40% Gel 15 Gram(s) Oral once PRN  dextrose 5%. 1000 milliLiter(s) IV Continuous <Continuous>  dextrose 50% Injectable 12.5 Gram(s) IV Push once  dextrose 50% Injectable 25 Gram(s) IV Push once  dextrose 50% Injectable 25 Gram(s) IV Push once  enoxaparin Injectable 40 milliGRAM(s) SubCutaneous daily  glucagon  Injectable 1 milliGRAM(s) IntraMuscular once PRN  insulin lispro (HumaLOG) corrective regimen sliding scale   SubCutaneous three times a day before meals  insulin lispro (HumaLOG) corrective regimen sliding scale   SubCutaneous at bedtime  lactulose Syrup 10 Gram(s) Oral two times a day  polyethylene glycol 3350 17 Gram(s) Oral daily  senna 1 Tablet(s) Oral daily      Objective:  Vital Signs Last 24 Hrs  T(C): 36.8 (14 Oct 2020 14:35), Max: 36.8 (14 Oct 2020 14:35)  T(F): 98.2 (14 Oct 2020 14:35), Max: 98.2 (14 Oct 2020 14:35)  HR: 67 (14 Oct 2020 14:35) (47 - 70)  BP: 96/54 (14 Oct 2020 14:35) (91/58 - 97/61)  BP(mean): --  RR: 20 (14 Oct 2020 14:35) (18 - 20)  SpO2: 95% (14 Oct 2020 14:35) (94% - 98%)    PHYSICAL EXAM:  Constitutional:NAD  Eyes:COLLIN, EOMI  Ear/Nose/Throat: no thrush, mucositis.  Moist mucous membranes	  Neck:no JVD, no lymphadenopathy, supple  Respiratory: CTA jeff  Cardiovascular: S1S2 RRR, no murmurs  Gastrointestinal:soft, nontender,  nondistended (+) BS  Extremities:no e/e/c  Skin:  no rashes, open wounds or ulcerations        LABS:                        14.0   12.23 )-----------( 628      ( 13 Oct 2020 07:01 )             44.5     10-13    134<L>  |  101  |  20  ----------------------------<  93  4.4   |  26  |  0.59    Ca    8.9      13 Oct 2020 07:01  Phos  3.5     10-13  Mg     2.2     10-13    TPro  6.3  /  Alb  3.1<L>  /  TBili  0.3  /  DBili  < 0.2  /  AST  13  /  ALT  8   /  AlkPhos  98  10-13          Procalcitonin, Serum: 0.04 (10-13 @ 07:01)  Procalcitonin, Serum: 0.15 (10-07 @ 06:50)  Procalcitonin, Serum: 0.68 (10-04 @ 06:50)  Procalcitonin, Serum: 0.75 (10-03 @ 15:30)            Rapid RVP Result: Detected          MICROBIOLOGY:          RADIOLOGY & ADDITIONAL STUDIES:

## 2020-10-14 NOTE — PROGRESS NOTE ADULT - ASSESSMENT
54 yo F without PMH presents to ED with 5 days of fever, cough, shortness of breath found hypoxic in ED requiring bipap and with +COVID-19 to be admitted for further management.     cornovairus pneumonia with hypoxic resp failure:  On remdesivir:  On dexa :  DVT prophylaxis    she is still o n antiviral meds:? remdesivir restarted?reason extended course:   Clinically she looks OK: doubt any superadded bacterial infection:  on dvt prophylaxis  try to wean the oxygen to nasal cannula:   d di elinor has increased a little but for few sdyas ago: but she hasno change in resp symtpoms:  CRP is increased too: follow inflmmatory markers:   ID to see today :   ASHLEY NP      10/12:  she seems to be doing pretty good:  her oxygenation is better too:  would change to nasal cannula:  on anti viral as well as steroids:  She is totally symptomatic  dvt prophylaxis  hopefully will cont to improve : dc planning in 1- 2 days:  DW NP    10/13:  seems to be doign ok : no SOB : no cough :   on 4 L of oxygen ; try to weanoff oxygen : she has no resp symtpoms: feels pretty good:   remdesivir finished:  omn dexa:  dw np    10/14:  doing ok : no sob : NO COUGH : oxygenation isd OK: o1 decreased to 2 L of oxygen  monitor o2 sao2 closely:  dexa for 10 days: dvt prophylaxis  ambulate as tleratred: ASHLEY her

## 2020-10-14 NOTE — PROGRESS NOTE ADULT - ASSESSMENT
54 yo F without PMH presents to ED with 5 days of fever, cough, shortness of breath found hypoxic in ED requiring bipap and with +COVID-19    Problem/Plan - 1:  ·  Problem: Acute hypoxemic respiratory failure.  Plan: Likely 2/2 COVID, currently on HFNC   - finished remdesivir    Problem/Plan - 2:  ·  Problem: 2019 novel coronavirus disease (COVID-19).  Plan: improved  symptomatic management   wean off oxygen    dc planning

## 2020-10-14 NOTE — PROGRESS NOTE ADULT - SUBJECTIVE AND OBJECTIVE BOX
Patient is a 55y old  Female who presents with a chief complaint of SOB (13 Oct 2020 19:29)      Any change in ROS: She diod well on 4 L : currently oxygen decreased to 2 L ofoxygen : satting well:       MEDICATIONS  (STANDING):  ALBUTerol    90 MICROgram(s) HFA Inhaler 1 Puff(s) Inhalation every 4 hours  bisacodyl Suppository 10 milliGRAM(s) Rectal once  dexAMETHasone  Injectable 6 milliGRAM(s) IV Push daily  dextrose 5%. 1000 milliLiter(s) (50 mL/Hr) IV Continuous <Continuous>  dextrose 50% Injectable 12.5 Gram(s) IV Push once  dextrose 50% Injectable 25 Gram(s) IV Push once  dextrose 50% Injectable 25 Gram(s) IV Push once  enoxaparin Injectable 40 milliGRAM(s) SubCutaneous daily  insulin lispro (HumaLOG) corrective regimen sliding scale   SubCutaneous three times a day before meals  insulin lispro (HumaLOG) corrective regimen sliding scale   SubCutaneous at bedtime  lactulose Syrup 10 Gram(s) Oral two times a day  polyethylene glycol 3350 17 Gram(s) Oral daily  senna 1 Tablet(s) Oral daily    MEDICATIONS  (PRN):  acetaminophen   Tablet .. 650 milliGRAM(s) Oral every 6 hours PRN Temp greater or equal to 38C (100.4F), Moderate Pain (4 - 6)  dextrose 40% Gel 15 Gram(s) Oral once PRN Blood Glucose LESS THAN 70 milliGRAM(s)/deciliter  glucagon  Injectable 1 milliGRAM(s) IntraMuscular once PRN Glucose LESS THAN 70 milligrams/deciliter    Vital Signs Last 24 Hrs  T(C): 36.6 (14 Oct 2020 05:54), Max: 36.7 (13 Oct 2020 17:54)  T(F): 97.9 (14 Oct 2020 05:54), Max: 98.1 (13 Oct 2020 17:54)  HR: 47 (14 Oct 2020 05:54) (47 - 70)  BP: 95/55 (14 Oct 2020 05:54) (91/58 - 99/57)  BP(mean): --  RR: 18 (14 Oct 2020 05:54) (18 - 19)  SpO2: 98% (14 Oct 2020 05:54) (94% - 98%)    I&O's Summary    13 Oct 2020 07:01  -  14 Oct 2020 07:00  --------------------------------------------------------  IN: 600 mL / OUT: 0 mL / NET: 600 mL          Physical Exam:   GENERAL: NAD, well-groomed, well-developed  HEENT: COLLIN/   Atraumatic, Normocephalic  ENMT: No tonsillar erythema, exudates, or enlargement; Moist mucous membranes, Good dentition, No lesions  NECK: Supple, No JVD, Normal thyroid  CHEST/LUNG: Clear to auscultaion, ; No rales, rhonchi, wheezing, or rubs  CVS: Regular rate and rhythm; No murmurs, rubs, or gallops  GI: : Soft, Nontender, Nondistended; Bowel sounds present  NERVOUS SYSTEM:  Alert & Oriented X3  EXTREMITIES:  2+ Peripheral Pulses, No clubbing, cyanosis, or edema  LYMPH: No lymphadenopathy noted  SKIN: No rashes or lesions  ENDOCRINOLOGY: No Thyromegaly  PSYCH: Appropriate    Labs:                              14.0   12.23 )-----------( 628      ( 13 Oct 2020 07:01 )             44.5                         13.5   10.87 )-----------( 623      ( 12 Oct 2020 05:55 )             42.0                         13.7   11.05 )-----------( 649      ( 11 Oct 2020 06:00 )             42.9     10-13    134<L>  |  101  |  20  ----------------------------<  93  4.4   |  26  |  0.59  10-12    135  |  100  |  21  ----------------------------<  89  4.6   |  24  |  0.55  10-11    134<L>  |  101  |  21  ----------------------------<  94  4.6   |  25  |  0.55    Ca    8.9      13 Oct 2020 07:01  Phos  3.5     10-13  Mg     2.2     10-13    TPro  6.3  /  Alb  3.1<L>  /  TBili  0.3  /  DBili  < 0.2  /  AST  13  /  ALT  8   /  AlkPhos  98  10-13  TPro  6.3  /  Alb  3.2<L>  /  TBili  0.3  /  DBili  < 0.2  /  AST  14  /  ALT  10  /  AlkPhos  91  10-11    CAPILLARY BLOOD GLUCOSE      POCT Blood Glucose.: 132 mg/dL (14 Oct 2020 11:49)  POCT Blood Glucose.: 95 mg/dL (14 Oct 2020 07:23)  POCT Blood Glucose.: 129 mg/dL (13 Oct 2020 21:03)  POCT Blood Glucose.: 117 mg/dL (13 Oct 2020 16:50)      LIVER FUNCTIONS - ( 13 Oct 2020 07:01 )  Alb: 3.1 g/dL / Pro: 6.3 g/dL / ALK PHOS: 98 u/L / ALT: 8 u/L / AST: 13 u/L / GGT: x               D-Dimer Assay, Quantitative: 233 ng/mL (10-13 @ 07:01)  Procalcitonin, Serum: 0.04 ng/mL (10-13 @ 07:01)      rad< from: Xray Chest 1 View- PORTABLE-Urgent (10.03.20 @ 16:05) >    EXAM:  XR CHEST PORTABLE URGENT 1V        PROCEDURE DATE:  Oct  3 2020         INTERPRETATION:  CLINICAL INFORMATION: Shortness of breath, cough, fever.    EXAM: Frontal radiograph of the chest.    COMPARISON: Chest radiograph from    FINDINGS:  Limited inspiration.  Right base linear opacity, left base patchy opacity.  There is no pleural effusion or pneumothorax.  The heart size is not well evaluated on this projection.  The visualized osseous structures demonstrate no acute pathology.    IMPRESSION:  Limited inspiration, right lower lung linear atelectasis, left lower lung atelectasis vs pneumonia.            AIDEN FREEDMAN M.D., RADIOLOGY RESIDENT  This document has been electronically signed.  JUAN CARLOS KENT M.D., ATTENDING RADIOLOGIST  This document has been electronically signed. Oct  4 2020  8:02AM    < end of copied text >    RECENT CULTURES:        RESPIRATORY CULTURES:          Studies  Chest X-RAY  CT SCAN Chest   Venous Dopplers: LE:   CT Abdomen  Others

## 2020-10-14 NOTE — PROGRESS NOTE ADULT - SUBJECTIVE AND OBJECTIVE BOX
Patient is a 55y old  Female who presents with a chief complaint of SOB (14 Oct 2020 12:47)      INTERVAL HPI/OVERNIGHT EVENTS:  T(C): 36.8 (10-14-20 @ 14:35), Max: 36.8 (10-14-20 @ 14:35)  HR: 67 (10-14-20 @ 14:35) (47 - 70)  BP: 96/54 (10-14-20 @ 14:35) (91/58 - 99/57)  RR: 20 (10-14-20 @ 14:35) (18 - 20)  SpO2: 95% (10-14-20 @ 14:35) (94% - 98%)  Wt(kg): --  I&O's Summary    13 Oct 2020 07:01  -  14 Oct 2020 07:00  --------------------------------------------------------  IN: 600 mL / OUT: 0 mL / NET: 600 mL        LABS:                        14.0   12.23 )-----------( 628      ( 13 Oct 2020 07:01 )             44.5     10-13    134<L>  |  101  |  20  ----------------------------<  93  4.4   |  26  |  0.59    Ca    8.9      13 Oct 2020 07:01  Phos  3.5     10-13  Mg     2.2     10-13    TPro  6.3  /  Alb  3.1<L>  /  TBili  0.3  /  DBili  < 0.2  /  AST  13  /  ALT  8   /  AlkPhos  98  10-13        CAPILLARY BLOOD GLUCOSE      POCT Blood Glucose.: 121 mg/dL (14 Oct 2020 16:27)  POCT Blood Glucose.: 132 mg/dL (14 Oct 2020 11:49)  POCT Blood Glucose.: 95 mg/dL (14 Oct 2020 07:23)  POCT Blood Glucose.: 129 mg/dL (13 Oct 2020 21:03)            MEDICATIONS  (STANDING):  ALBUTerol    90 MICROgram(s) HFA Inhaler 1 Puff(s) Inhalation every 4 hours  bisacodyl Suppository 10 milliGRAM(s) Rectal once  dexAMETHasone  Injectable 6 milliGRAM(s) IV Push daily  dextrose 5%. 1000 milliLiter(s) (50 mL/Hr) IV Continuous <Continuous>  dextrose 50% Injectable 12.5 Gram(s) IV Push once  dextrose 50% Injectable 25 Gram(s) IV Push once  dextrose 50% Injectable 25 Gram(s) IV Push once  enoxaparin Injectable 40 milliGRAM(s) SubCutaneous daily  insulin lispro (HumaLOG) corrective regimen sliding scale   SubCutaneous three times a day before meals  insulin lispro (HumaLOG) corrective regimen sliding scale   SubCutaneous at bedtime  lactulose Syrup 10 Gram(s) Oral two times a day  polyethylene glycol 3350 17 Gram(s) Oral daily  senna 1 Tablet(s) Oral daily    MEDICATIONS  (PRN):  acetaminophen   Tablet .. 650 milliGRAM(s) Oral every 6 hours PRN Temp greater or equal to 38C (100.4F), Moderate Pain (4 - 6)  dextrose 40% Gel 15 Gram(s) Oral once PRN Blood Glucose LESS THAN 70 milliGRAM(s)/deciliter  glucagon  Injectable 1 milliGRAM(s) IntraMuscular once PRN Glucose LESS THAN 70 milligrams/deciliter          PHYSICAL EXAM:  GENERAL: NAD, well-groomed, well-developed  HEAD:  Atraumatic, Normocephalic  CHEST/LUNG: Clear to percussion bilaterally; No rales, rhonchi, wheezing, or rubs  HEART: Regular rate and rhythm; No murmurs, rubs, or gallops  ABDOMEN: Soft, Nontender, Nondistended; Bowel sounds present  EXTREMITIES:  2+ Peripheral Pulses, No clubbing, cyanosis, or edema  LYMPH: No lymphadenopathy noted  SKIN: No rashes or lesions    Care Discussed with Consultants/Other Providers [ ] YES  [ ] NO

## 2020-10-14 NOTE — PROGRESS NOTE ADULT - ASSESSMENT
56 yo F with no significant PMH presented with SOB x 5 days, a/w loss of sense of smell and taste, nausea, and diarrhea. Patient has had diarrhea for about 2 days with 3 episodes of watery diarrhea today. Patient endorses nonproductive cough as well. She states she has been outside. Denies sick contacts but her  recently came down with a fever yesterday. Patient states she had the COVID antibody test done recently and was negative. Per ED documentation, pt went to PCP yesterday and was prescribed a Z-pack. Per EMS she was hypoxic to 80s on RA, improved to 93% on 6L NC.     On arrival to ED, patient was still satting >90s on NC but noted to be tachypneic, with increased WOB. She was placed on BIPAP. (03 Oct 2020 22:16)    HOSP COURSE:    Pt on remdesivir, dexamethasone and prophylactic lovenox.  Clinically improving, pt weaned off of bipap, and satting well on room air (as of 10/12).  Cxr clear.    ID Consult called for further recommendations.       Covid-19 with acute hypoxic resp failure:    - Pt with initial hypoxia to 80s on RA, and tachypneic at presentation, requiring Bipap for increased WOB.  Pt seen by [facility], deemed not a candidate.  Clinical findings likely secondary to Covid-19 pna.  Cov pcr and Abs (+).      - Pt on extended course of remedesivir as per primary team.  Also on dexamethasone IV.  Complete 10 day course and observe off.  DD is elevated, pt remains on prophylactic lovenox.  Satting adequately on NC.  Wean off O2 as tolerated.     - No evidence for superimposed bacterial pna on cxr.  No productive cough or fever.  No indication for abx at this time.     CRP: 10 <-- 263  DD:  233<-- 154 <--  less than 150  Ferritin: 165 <-- 328  PCT:  0.04 <-- 0.15 <-- 0.68      Asymptomatic bacteruria:    - UA (neg), Ucx < 50K E.coli, likely contaminant.  Pt w/o urinary symptoms.  No indication for abx.       * Cont to wean off O2.  Pt completed remdesivir.  Recommend d/c dexamethasone (s/p completion of 10 day course).  d/c planning.       Luz [facility]  [phone]

## 2020-10-15 LAB
GLUCOSE BLDC GLUCOMTR-MCNC: 77 MG/DL — SIGNIFICANT CHANGE UP (ref 70–99)
GLUCOSE BLDC GLUCOMTR-MCNC: 78 MG/DL — SIGNIFICANT CHANGE UP (ref 70–99)
GLUCOSE BLDC GLUCOMTR-MCNC: 92 MG/DL — SIGNIFICANT CHANGE UP (ref 70–99)

## 2020-10-15 RX ADMIN — SENNA PLUS 1 TABLET(S): 8.6 TABLET ORAL at 11:59

## 2020-10-15 RX ADMIN — ALBUTEROL 1 PUFF(S): 90 AEROSOL, METERED ORAL at 09:46

## 2020-10-15 RX ADMIN — ALBUTEROL 1 PUFF(S): 90 AEROSOL, METERED ORAL at 17:27

## 2020-10-15 RX ADMIN — ALBUTEROL 1 PUFF(S): 90 AEROSOL, METERED ORAL at 13:42

## 2020-10-15 RX ADMIN — ALBUTEROL 1 PUFF(S): 90 AEROSOL, METERED ORAL at 02:43

## 2020-10-15 RX ADMIN — LACTULOSE 10 GRAM(S): 10 SOLUTION ORAL at 17:28

## 2020-10-15 RX ADMIN — ALBUTEROL 1 PUFF(S): 90 AEROSOL, METERED ORAL at 22:00

## 2020-10-15 RX ADMIN — ALBUTEROL 1 PUFF(S): 90 AEROSOL, METERED ORAL at 05:52

## 2020-10-15 RX ADMIN — POLYETHYLENE GLYCOL 3350 17 GRAM(S): 17 POWDER, FOR SOLUTION ORAL at 11:58

## 2020-10-15 RX ADMIN — ENOXAPARIN SODIUM 40 MILLIGRAM(S): 100 INJECTION SUBCUTANEOUS at 11:59

## 2020-10-15 NOTE — PROGRESS NOTE ADULT - SUBJECTIVE AND OBJECTIVE BOX
Infectious Diseases progress note:    Subjective: Resting comfortably.  Satting 97% on 1L nc.  Afebrile.     ROS:  CONSTITUTIONAL:  No fever, chills, rigors  CARDIOVASCULAR:  No chest pain or palpitations  RESPIRATORY:   No SOB, cough, dyspnea on exertion.  No wheezing  GASTROINTESTINAL:  No abd pain, N/V, diarrhea/constipation  EXTREMITIES:  No swelling or joint pain  GENITOURINARY:  No burning on urination, increased frequency or urgency.  No flank pain  NEUROLOGIC:  No HA, visual disturbances  SKIN: No rashes    Allergies    No Known Drug Allergies  Nuts (Hives)    Intolerances        ANTIBIOTICS/RELEVANT:  antimicrobials    immunologic:    OTHER:  acetaminophen   Tablet .. 650 milliGRAM(s) Oral every 6 hours PRN  ALBUTerol    90 MICROgram(s) HFA Inhaler 1 Puff(s) Inhalation every 4 hours  bisacodyl Suppository 10 milliGRAM(s) Rectal once  dextrose 40% Gel 15 Gram(s) Oral once PRN  dextrose 5%. 1000 milliLiter(s) IV Continuous <Continuous>  dextrose 50% Injectable 12.5 Gram(s) IV Push once  dextrose 50% Injectable 25 Gram(s) IV Push once  dextrose 50% Injectable 25 Gram(s) IV Push once  enoxaparin Injectable 40 milliGRAM(s) SubCutaneous daily  glucagon  Injectable 1 milliGRAM(s) IntraMuscular once PRN  insulin lispro (HumaLOG) corrective regimen sliding scale   SubCutaneous three times a day before meals  insulin lispro (HumaLOG) corrective regimen sliding scale   SubCutaneous at bedtime  lactulose Syrup 10 Gram(s) Oral two times a day  polyethylene glycol 3350 17 Gram(s) Oral daily  senna 1 Tablet(s) Oral daily      Objective:  Vital Signs Last 24 Hrs  T(C): 36.8 (15 Oct 2020 13:37), Max: 36.8 (14 Oct 2020 14:35)  T(F): 98.3 (15 Oct 2020 13:37), Max: 98.3 (15 Oct 2020 13:37)  HR: 69 (15 Oct 2020 13:37) (56 - 69)  BP: 99/60 (15 Oct 2020 13:37) (95/59 - 105/64)  BP(mean): --  RR: 18 (15 Oct 2020 13:37) (16 - 20)  SpO2: 97% (15 Oct 2020 13:37) (94% - 98%)    PHYSICAL EXAM:  Constitutional:NAD  Eyes:COLLIN, EOMI  Ear/Nose/Throat: no thrush, mucositis.  Moist mucous membranes	  Neck:no JVD, no lymphadenopathy, supple  Respiratory: CTA jeff  Cardiovascular: S1S2 RRR, no murmurs  Gastrointestinal:soft, nontender,  nondistended (+) BS  Extremities:no e/e/c  Skin:  no rashes, open wounds or ulcerations        LABS:                Procalcitonin, Serum: 0.04 (10-13 @ 07:01)  Procalcitonin, Serum: 0.15 (10-07 @ 06:50)  Procalcitonin, Serum: 0.68 (10-04 @ 06:50)  Procalcitonin, Serum: 0.75 (10-03 @ 15:30)            Rapid RVP Result: Detected          MICROBIOLOGY:          RADIOLOGY & ADDITIONAL STUDIES:

## 2020-10-15 NOTE — PROGRESS NOTE ADULT - ASSESSMENT
ogr55 yo F without PMH presents to ED with 5 days of fever, cough, shortness of breath found hypoxic in ED requiring bipap and with +COVID-19    Problem/Plan - 1:  ·  Problem: Acute hypoxemic respiratory failure.  Plan: Likely 2/2 COVID, currently on HFNC   - finished remdesivir    Problem/Plan - 2:  ·  Problem: 2019 novel coronavirus disease (COVID-19).  Plan: improved  symptomatic management   wean off oxygen    dc planning

## 2020-10-15 NOTE — PROGRESS NOTE ADULT - SUBJECTIVE AND OBJECTIVE BOX
Patient is a 55y old  Female who presents with a chief complaint of SOB (14 Oct 2020 18:28)      Any change in ROS: Doing very good:  On 1 L ofoxygen     MEDICATIONS  (STANDING):  ALBUTerol    90 MICROgram(s) HFA Inhaler 1 Puff(s) Inhalation every 4 hours  bisacodyl Suppository 10 milliGRAM(s) Rectal once  dextrose 5%. 1000 milliLiter(s) (50 mL/Hr) IV Continuous <Continuous>  dextrose 50% Injectable 12.5 Gram(s) IV Push once  dextrose 50% Injectable 25 Gram(s) IV Push once  dextrose 50% Injectable 25 Gram(s) IV Push once  enoxaparin Injectable 40 milliGRAM(s) SubCutaneous daily  insulin lispro (HumaLOG) corrective regimen sliding scale   SubCutaneous three times a day before meals  insulin lispro (HumaLOG) corrective regimen sliding scale   SubCutaneous at bedtime  lactulose Syrup 10 Gram(s) Oral two times a day  polyethylene glycol 3350 17 Gram(s) Oral daily  senna 1 Tablet(s) Oral daily    MEDICATIONS  (PRN):  acetaminophen   Tablet .. 650 milliGRAM(s) Oral every 6 hours PRN Temp greater or equal to 38C (100.4F), Moderate Pain (4 - 6)  dextrose 40% Gel 15 Gram(s) Oral once PRN Blood Glucose LESS THAN 70 milliGRAM(s)/deciliter  glucagon  Injectable 1 milliGRAM(s) IntraMuscular once PRN Glucose LESS THAN 70 milligrams/deciliter    Vital Signs Last 24 Hrs  T(C): 36.6 (15 Oct 2020 09:49), Max: 36.8 (14 Oct 2020 14:35)  T(F): 97.8 (15 Oct 2020 09:49), Max: 98.2 (14 Oct 2020 14:35)  HR: 59 (15 Oct 2020 10:28) (56 - 67)  BP: 105/64 (15 Oct 2020 09:49) (95/59 - 105/64)  BP(mean): --  RR: 18 (15 Oct 2020 09:49) (16 - 20)  SpO2: 94% (15 Oct 2020 09:49) (94% - 98%)    I&O's Summary    14 Oct 2020 07:01  -  15 Oct 2020 07:00  --------------------------------------------------------  IN: 500 mL / OUT: 0 mL / NET: 500 mL          Physical Exam:   GENERAL: NAD, well-groomed, well-developed  HEENT: COLLIN/   Atraumatic, Normocephalic  ENMT: No tonsillar erythema, exudates, or enlargement; Moist mucous membranes, Good dentition, No lesions  NECK: Supple, No JVD, Normal thyroid  CHEST/LUNG: Clear to auscultaion  CVS: Regular rate and rhythm; No murmurs, rubs, or gallops  GI: : Soft, Nontender, Nondistended; Bowel sounds present  NERVOUS SYSTEM:  Alert & Oriented X3  EXTREMITIES:  2+ Peripheral Pulses, No clubbing, cyanosis, or edema  LYMPH: No lymphadenopathy noted  SKIN: No rashes or lesions  ENDOCRINOLOGY: No Thyromegaly  PSYCH: Appropriate    Labs:                              14.0   12.23 )-----------( 628      ( 13 Oct 2020 07:01 )             44.5                         13.5   10.87 )-----------( 623      ( 12 Oct 2020 05:55 )             42.0     10-13    134<L>  |  101  |  20  ----------------------------<  93  4.4   |  26  |  0.59  10-12    135  |  100  |  21  ----------------------------<  89  4.6   |  24  |  0.55      TPro  6.3  /  Alb  3.1<L>  /  TBili  0.3  /  DBili  < 0.2  /  AST  13  /  ALT  8   /  AlkPhos  98  10-13    CAPILLARY BLOOD GLUCOSE      POCT Blood Glucose.: 77 mg/dL (15 Oct 2020 11:44)  POCT Blood Glucose.: 92 mg/dL (15 Oct 2020 07:25)  POCT Blood Glucose.: 116 mg/dL (14 Oct 2020 21:24)  POCT Blood Glucose.: 121 mg/dL (14 Oct 2020 16:27)      trd< from: Xray Chest 1 View- PORTABLE-Urgent (10.03.20 @ 16:05) >    EXAM:  XR CHEST PORTABLE URGENT 1V        PROCEDURE DATE:  Oct  3 2020         INTERPRETATION:  CLINICAL INFORMATION: Shortness of breath, cough, fever.    EXAM: Frontal radiograph of the chest.    COMPARISON: Chest radiograph from    FINDINGS:  Limited inspiration.  Right base linear opacity, left base patchy opacity.  There is no pleural effusion or pneumothorax.  The heart size is not well evaluated on this projection.  The visualized osseous structures demonstrate no acute pathology.    IMPRESSION:  Limited inspiration, right lower lung linear atelectasis, left lower lung atelectasis vs pneumonia.            AIDEN FREEDMAN M.D., RADIOLOGY RESIDENT  This document has been electronically signed.  JUAN CARLOS KENT M.D., ATTENDING RADIOLOGIST  This document has been electronically signed. Oct  4 2020  8:02AM    < end of copied text >        D-Dimer Assay, Quantitative: 233 ng/mL (10-13 @ 07:01)  Procalcitonin, Serum: 0.04 ng/mL (10-13 @ 07:01)        RECENT CULTURES:        RESPIRATORY CULTURES:          Studies  Chest X-RAY  CT SCAN Chest   Venous Dopplers: LE:   CT Abdomen  Others

## 2020-10-15 NOTE — PROGRESS NOTE ADULT - SUBJECTIVE AND OBJECTIVE BOX
Patient is a 55y old  Female who presents with a chief complaint of SOB (15 Oct 2020 14:28)      INTERVAL HPI/OVERNIGHT EVENTS:  T(C): 36.7 (10-15-20 @ 17:23), Max: 36.8 (10-15-20 @ 13:37)  HR: 70 (10-15-20 @ 17:23) (56 - 70)  BP: 100/64 (10-15-20 @ 17:23) (95/59 - 105/64)  RR: 18 (10-15-20 @ 17:23) (16 - 19)  SpO2: 95% (10-15-20 @ 17:23) (94% - 98%)  Wt(kg): --  I&O's Summary    14 Oct 2020 07:01  -  15 Oct 2020 07:00  --------------------------------------------------------  IN: 500 mL / OUT: 0 mL / NET: 500 mL    15 Oct 2020 07:01  -  15 Oct 2020 17:58  --------------------------------------------------------  IN: 0 mL / OUT: 1 mL / NET: -1 mL        LABS:              CAPILLARY BLOOD GLUCOSE      POCT Blood Glucose.: 77 mg/dL (15 Oct 2020 11:44)  POCT Blood Glucose.: 92 mg/dL (15 Oct 2020 07:25)  POCT Blood Glucose.: 116 mg/dL (14 Oct 2020 21:24)            MEDICATIONS  (STANDING):  ALBUTerol    90 MICROgram(s) HFA Inhaler 1 Puff(s) Inhalation every 4 hours  bisacodyl Suppository 10 milliGRAM(s) Rectal once  dextrose 5%. 1000 milliLiter(s) (50 mL/Hr) IV Continuous <Continuous>  dextrose 50% Injectable 12.5 Gram(s) IV Push once  dextrose 50% Injectable 25 Gram(s) IV Push once  dextrose 50% Injectable 25 Gram(s) IV Push once  enoxaparin Injectable 40 milliGRAM(s) SubCutaneous daily  insulin lispro (HumaLOG) corrective regimen sliding scale   SubCutaneous three times a day before meals  insulin lispro (HumaLOG) corrective regimen sliding scale   SubCutaneous at bedtime  lactulose Syrup 10 Gram(s) Oral two times a day  polyethylene glycol 3350 17 Gram(s) Oral daily  senna 1 Tablet(s) Oral daily    MEDICATIONS  (PRN):  acetaminophen   Tablet .. 650 milliGRAM(s) Oral every 6 hours PRN Temp greater or equal to 38C (100.4F), Moderate Pain (4 - 6)  dextrose 40% Gel 15 Gram(s) Oral once PRN Blood Glucose LESS THAN 70 milliGRAM(s)/deciliter  glucagon  Injectable 1 milliGRAM(s) IntraMuscular once PRN Glucose LESS THAN 70 milligrams/deciliter          PHYSICAL EXAM:  GENERAL: NAD, well-groomed, well-developed  HEAD:  Atraumatic, Normocephalic  CHEST/LUNG: Clear to percussion bilaterally; No rales, rhonchi, wheezing, or rubs  HEART: Regular rate and rhythm; No murmurs, rubs, or gallops  ABDOMEN: Soft, Nontender, Nondistended; Bowel sounds present  EXTREMITIES:  2+ Peripheral Pulses, No clubbing, cyanosis, or edema  LYMPH: No lymphadenopathy noted  SKIN: No rashes or lesions    Care Discussed with Consultants/Other Providers [ ] YES  [ ] NO

## 2020-10-15 NOTE — PROGRESS NOTE ADULT - ASSESSMENT
56 yo F without PMH presents to ED with 5 days of fever, cough, shortness of breath found hypoxic in ED requiring bipap and with +COVID-19 to be admitted for further management.     cornovairus pneumonia with hypoxic resp failure:  On remdesivir:  On dexa :  DVT prophylaxis    she is still o n antiviral meds:? remdesivir restarted?reason extended course:   Clinically she looks OK: doubt any superadded bacterial infection:  on dvt prophylaxis  try to wean the oxygen to nasal cannula:   d di elinor has increased a little but for few sdyas ago: but she hasno change in resp symtpoms:  CRP is increased too: follow inflmmatory markers:   ID to see today :   ASHLEY KHOURY      10/12:  she seems to be doing pretty good:  her oxygenation is better too:  would change to nasal cannula:  on anti viral as well as steroids:  She is totally symptomatic  dvt prophylaxis  hopefully will cont to improve : dc planning in 1- 2 days:  DW NP    10/13:  seems to be doign ok : no SOB : no cough :   on 4 L of oxygen ; try to weanoff oxygen : she has no resp symtpoms: feels pretty good:   remdesivir finished:  omn dexa:  dw np    10/14:  doing ok : no sob : NO COUGH : oxygenation isd OK: o1 decreased to 2 L of oxygen  monitor o2 sao2 closely:  dexa for 10 days: dvt prophylaxis  ambulate as tleratred: ASHLEY her        10/15:  on 1 L of oxygen :  sao2 are pretty good:   check exercise oxygenation too:  she is afebrile and not SOB : seems to have improved quite a bit:  DC planning per [facility] team :  ASHLEY KHOURY

## 2020-10-15 NOTE — PROGRESS NOTE ADULT - ASSESSMENT
54 yo F with no significant PMH presented with SOB x 5 days, a/w loss of sense of smell and taste, nausea, and diarrhea. Patient has had diarrhea for about 2 days with 3 episodes of watery diarrhea today. Patient endorses nonproductive cough as well. She states she has been outside. Denies sick contacts but her  recently came down with a fever yesterday. Patient states she had the COVID antibody test done recently and was negative. Per ED documentation, pt went to PCP yesterday and was prescribed a Z-pack. Per EMS she was hypoxic to 80s on RA, improved to 93% on 6L NC.     On arrival to ED, patient was still satting >90s on NC but noted to be tachypneic, with increased WOB. She was placed on BIPAP. (03 Oct 2020 22:16)    HOSP COURSE:    Pt on remdesivir, dexamethasone and prophylactic lovenox.  Clinically improving, pt weaned off of bipap, and satting well on room air (as of 10/12).  Cxr clear.    ID Consult called for further recommendations.       Covid-19 with acute hypoxic resp failure:    - Pt with initial hypoxia to 80s on RA, and tachypneic at presentation, requiring Bipap for increased WOB.  Pt seen by [facility], deemed not a candidate.  Clinical findings likely secondary to Covid-19 pna.  Cov pcr and Abs (+).      - Pt on extended course of remedesivir as per primary team.  Also on dexamethasone IV.  Complete 10 day course and observe off.  DD is elevated, pt remains on prophylactic lovenox.  Satting adequately on NC.  Wean off O2 as tolerated.     - No evidence for superimposed bacterial pna on cxr.  No productive cough or fever.  No indication for abx at this time.     CRP: 10 <-- 263  DD:  233<-- 154 <--  less than 150  Ferritin: 165 <-- 328  PCT:  0.04 <-- 0.15 <-- 0.68      Asymptomatic bacteruria:    - UA (neg), Ucx < 50K E.coli, likely contaminant.  Pt w/o urinary symptoms.  No indication for abx.       * Cont to wean off O2.  Pt completed remdesivir and dexamethasone.  d/c planning, awaiting pt to be weaned off supplemental O2.        Luz [facility]  [phone]

## 2020-10-16 LAB
ANION GAP SERPL CALC-SCNC: 12 MMO/L — SIGNIFICANT CHANGE UP (ref 7–14)
BUN SERPL-MCNC: 16 MG/DL — SIGNIFICANT CHANGE UP (ref 7–23)
CALCIUM SERPL-MCNC: 8.6 MG/DL — SIGNIFICANT CHANGE UP (ref 8.4–10.5)
CHLORIDE SERPL-SCNC: 100 MMOL/L — SIGNIFICANT CHANGE UP (ref 98–107)
CO2 SERPL-SCNC: 25 MMOL/L — SIGNIFICANT CHANGE UP (ref 22–31)
CREAT SERPL-MCNC: 0.56 MG/DL — SIGNIFICANT CHANGE UP (ref 0.5–1.3)
GLUCOSE BLDC GLUCOMTR-MCNC: 77 MG/DL — SIGNIFICANT CHANGE UP (ref 70–99)
GLUCOSE BLDC GLUCOMTR-MCNC: 88 MG/DL — SIGNIFICANT CHANGE UP (ref 70–99)
GLUCOSE SERPL-MCNC: 78 MG/DL — SIGNIFICANT CHANGE UP (ref 70–99)
HCT VFR BLD CALC: 41 % — SIGNIFICANT CHANGE UP (ref 34.5–45)
HGB BLD-MCNC: 13.5 G/DL — SIGNIFICANT CHANGE UP (ref 11.5–15.5)
MCHC RBC-ENTMCNC: 28.3 PG — SIGNIFICANT CHANGE UP (ref 27–34)
MCHC RBC-ENTMCNC: 32.9 % — SIGNIFICANT CHANGE UP (ref 32–36)
MCV RBC AUTO: 86 FL — SIGNIFICANT CHANGE UP (ref 80–100)
NRBC # FLD: 0 K/UL — SIGNIFICANT CHANGE UP (ref 0–0)
PLATELET # BLD AUTO: 505 K/UL — HIGH (ref 150–400)
PMV BLD: 8.4 FL — SIGNIFICANT CHANGE UP (ref 7–13)
POTASSIUM SERPL-MCNC: 4.2 MMOL/L — SIGNIFICANT CHANGE UP (ref 3.5–5.3)
POTASSIUM SERPL-SCNC: 4.2 MMOL/L — SIGNIFICANT CHANGE UP (ref 3.5–5.3)
RBC # BLD: 4.77 M/UL — SIGNIFICANT CHANGE UP (ref 3.8–5.2)
RBC # FLD: 13.3 % — SIGNIFICANT CHANGE UP (ref 10.3–14.5)
SODIUM SERPL-SCNC: 137 MMOL/L — SIGNIFICANT CHANGE UP (ref 135–145)
WBC # BLD: 9.31 K/UL — SIGNIFICANT CHANGE UP (ref 3.8–10.5)
WBC # FLD AUTO: 9.31 K/UL — SIGNIFICANT CHANGE UP (ref 3.8–10.5)

## 2020-10-16 RX ORDER — SODIUM CHLORIDE 9 MG/ML
500 INJECTION INTRAMUSCULAR; INTRAVENOUS; SUBCUTANEOUS ONCE
Refills: 0 | Status: COMPLETED | OUTPATIENT
Start: 2020-10-16 | End: 2020-10-16

## 2020-10-16 RX ORDER — SODIUM CHLORIDE 9 MG/ML
1000 INJECTION INTRAMUSCULAR; INTRAVENOUS; SUBCUTANEOUS
Refills: 0 | Status: DISCONTINUED | OUTPATIENT
Start: 2020-10-16 | End: 2020-10-17

## 2020-10-16 RX ADMIN — SODIUM CHLORIDE 500 MILLILITER(S): 9 INJECTION INTRAMUSCULAR; INTRAVENOUS; SUBCUTANEOUS at 22:37

## 2020-10-16 RX ADMIN — SODIUM CHLORIDE 75 MILLILITER(S): 9 INJECTION INTRAMUSCULAR; INTRAVENOUS; SUBCUTANEOUS at 12:25

## 2020-10-16 RX ADMIN — ALBUTEROL 1 PUFF(S): 90 AEROSOL, METERED ORAL at 14:10

## 2020-10-16 RX ADMIN — ALBUTEROL 1 PUFF(S): 90 AEROSOL, METERED ORAL at 18:25

## 2020-10-16 RX ADMIN — ALBUTEROL 1 PUFF(S): 90 AEROSOL, METERED ORAL at 22:16

## 2020-10-16 RX ADMIN — ALBUTEROL 1 PUFF(S): 90 AEROSOL, METERED ORAL at 10:26

## 2020-10-16 RX ADMIN — LACTULOSE 10 GRAM(S): 10 SOLUTION ORAL at 18:25

## 2020-10-16 RX ADMIN — ALBUTEROL 1 PUFF(S): 90 AEROSOL, METERED ORAL at 05:12

## 2020-10-16 RX ADMIN — ALBUTEROL 1 PUFF(S): 90 AEROSOL, METERED ORAL at 02:11

## 2020-10-16 RX ADMIN — ENOXAPARIN SODIUM 40 MILLIGRAM(S): 100 INJECTION SUBCUTANEOUS at 12:25

## 2020-10-16 NOTE — PROGRESS NOTE ADULT - PROVIDER SPECIALTY LIST ADULT
Hospitalist
Infectious Disease
Pulmonology
Hospitalist

## 2020-10-16 NOTE — PROGRESS NOTE ADULT - SUBJECTIVE AND OBJECTIVE BOX
Infectious Diseases progress note:    Subjective:  NAD, still with desaturation when ambulating.  States she feels well.      ROS:  CONSTITUTIONAL:  No fever, chills, rigors  CARDIOVASCULAR:  No chest pain or palpitations  RESPIRATORY:   No SOB, cough, dyspnea on exertion.  No wheezing  GASTROINTESTINAL:  No abd pain, N/V, diarrhea/constipation  EXTREMITIES:  No swelling or joint pain  GENITOURINARY:  No burning on urination, increased frequency or urgency.  No flank pain  NEUROLOGIC:  No HA, visual disturbances  SKIN: No rashes    Allergies    No Known Drug Allergies  Nuts (Hives)    Intolerances        ANTIBIOTICS/RELEVANT:  antimicrobials    immunologic:    OTHER:  acetaminophen   Tablet .. 650 milliGRAM(s) Oral every 6 hours PRN  ALBUTerol    90 MICROgram(s) HFA Inhaler 1 Puff(s) Inhalation every 4 hours  bisacodyl Suppository 10 milliGRAM(s) Rectal once  dextrose 40% Gel 15 Gram(s) Oral once PRN  dextrose 5%. 1000 milliLiter(s) IV Continuous <Continuous>  dextrose 50% Injectable 12.5 Gram(s) IV Push once  dextrose 50% Injectable 25 Gram(s) IV Push once  dextrose 50% Injectable 25 Gram(s) IV Push once  enoxaparin Injectable 40 milliGRAM(s) SubCutaneous daily  glucagon  Injectable 1 milliGRAM(s) IntraMuscular once PRN  insulin lispro (HumaLOG) corrective regimen sliding scale   SubCutaneous three times a day before meals  insulin lispro (HumaLOG) corrective regimen sliding scale   SubCutaneous at bedtime  lactulose Syrup 10 Gram(s) Oral two times a day  polyethylene glycol 3350 17 Gram(s) Oral daily  senna 1 Tablet(s) Oral daily  sodium chloride 0.9%. 1000 milliLiter(s) IV Continuous <Continuous>      Objective:  Vital Signs Last 24 Hrs  T(C): 36.9 (16 Oct 2020 15:22), Max: 36.9 (16 Oct 2020 15:22)  T(F): 98.4 (16 Oct 2020 15:22), Max: 98.4 (16 Oct 2020 15:22)  HR: 79 (16 Oct 2020 15:22) (61 - 110)  BP: 97/65 (16 Oct 2020 15:22) (80/48 - 108/69)  BP(mean): --  RR: 17 (16 Oct 2020 15:22) (17 - 19)  SpO2: 91% (16 Oct 2020 15:54) (85% - 97%)    PHYSICAL EXAM:  Constitutional:NAD  Eyes:COLLIN, EOMI  Ear/Nose/Throat: no thrush, mucositis.  Moist mucous membranes	  Neck:no JVD, no lymphadenopathy, supple  Respiratory: CTA jeff  Cardiovascular: S1S2 RRR, no murmurs  Gastrointestinal:soft, nontender,  nondistended (+) BS  Extremities:no e/e/c  Skin:  no rashes, open wounds or ulcerations        LABS:                        13.5   9.31  )-----------( 505      ( 16 Oct 2020 05:44 )             41.0     10-16    137  |  100  |  16  ----------------------------<  78  4.2   |  25  |  0.56    Ca    8.6      16 Oct 2020 05:44            Procalcitonin, Serum: 0.04 (10-13 @ 07:01)  Procalcitonin, Serum: 0.15 (10-07 @ 06:50)  Procalcitonin, Serum: 0.68 (10-04 @ 06:50)  Procalcitonin, Serum: 0.75 (10-03 @ 15:30)            Rapid RVP Result: Detected          MICROBIOLOGY:      Culture - Urine (10.04.20 @ 02:24)   - Amikacin: S <=16   - Amoxicillin/Clavulanic Acid: S <=8/4   - Ampicillin: R >16 These ampicillin results predict results for amoxicillin   - Ampicillin/Sulbactam: I 16/8 Enterobacter, Citrobacter, and Serratia may develop resistance during prolonged therapy (3-4 days)   - Aztreonam: S <=4   - Cefazolin: S 4 (MIC_[id number]) For uncomplicated UTI with K. pneumoniae, E. coli, or P. mirablis: MERLENE <=16 is sensitive and MERLENE >=32 is resistant. This also predicts results for oral agents cefaclor, cefdinir, cefpodoxime, cefprozil, cefuroxime axetil, cephalexin and locarbef for uncomplicated UTI. Note that some isolates may be susceptible to these agents while testing resistant to cefazolin.   - Cefepime: S <=2   - Cefoxitin: S <=8   - Ceftriaxone: S <=1 Enterobacter, Citrobacter, and Serratia may develop resistance during prolonged therapy   - Ciprofloxacin: S <=0.25   - Ertapenem: S <=0.5   - Gentamicin: R >8   - Imipenem: S <=1   - Levofloxacin: S <=0.5   - Meropenem: S <=1   - Nitrofurantoin: S <=32 Should not be used to treat pyelonephritis   - Piperacillin/Tazobactam: S <=8   - Tigecycline: S <=2   - Tobramycin: R >8   - Trimethoprim/Sulfamethoxazole: R >2/38   Specimen Source: .Urine Clean Catch (Midstream)   Culture Results:   10,000 - 49,000 CFU/mL Escherichia coli   <10,000 CFU/ml Normal Urogenital karoline present   Organism Identification: Escherichia coli   Organism: Escherichia coli   Method Type: MERLENE     RADIOLOGY & ADDITIONAL STUDIES:    < from: Xray Chest 1 View- PORTABLE-Urgent (10.03.20 @ 16:05) >    FINDINGS:  Limited inspiration.  Right base linear opacity, left base patchy opacity.  There is no pleural effusion or pneumothorax.  The heart size is not well evaluated on this projection.  The visualized osseous structures demonstrate no acute pathology.    IMPRESSION:  Limited inspiration, right lower lung linear atelectasis, left lower lung atelectasis vs pneumonia.    < end of copied text >

## 2020-10-16 NOTE — PROGRESS NOTE ADULT - ASSESSMENT
54 yo F without PMH presents to ED with 5 days of fever, cough, shortness of breath found hypoxic in ED requiring bipap and with +COVID-19 to be admitted for further management.     cornovairus pneumonia with hypoxic resp failure:  On remdesivir:  On dexa :  DVT prophylaxis    she is still o n antiviral meds:? remdesivir restarted?reason extended course:   Clinically she looks OK: doubt any superadded bacterial infection:  on dvt prophylaxis  try to wean the oxygen to nasal cannula:   d di elinor has increased a little but for few sdyas ago: but she hasno change in resp symtpoms:  CRP is increased too: follow inflmmatory markers:   ID to see today :   ASHLEY KHOURY      10/12:  she seems to be doing pretty good:  her oxygenation is better too:  would change to nasal cannula:  on anti viral as well as steroids:  She is totally symptomatic  dvt prophylaxis  hopefully will cont to improve : dc planning in 1- 2 days:  DW NP    10/13:  seems to be doign ok : no SOB : no cough :   on 4 L of oxygen ; try to weanoff oxygen : she has no resp symtpoms: feels pretty good:   remdesivir finished:  omn dexa:  dw np    10/14:  doing ok : no sob : NO COUGH : oxygenation isd OK: o1 decreased to 2 L of oxygen  monitor o2 sao2 closely:  dexa for 10 days: dvt prophylaxis  ambulate as tleratred: ASHLEY her        10/15:  on 1 L of oxygen :  sao2 are pretty good:   check exercise oxygenation too:  she is afebrile and not SOB : seems to have improved quite a bit:  DC planning per [facility] team :  ASHLEY KHOURY    10/16:  She completed the rx for covid pneumonia  she still desats on room air:  ? home oxygen on dc:  she has been afebrile:  She is not SOB or having any chest -pain   dvt proaphkyxlis  Clinically stable but still hypoxic: mildly  She was also with hypotensive: ion the night: recovered spontaneously:  ? IV fluids  DW Np

## 2020-10-16 NOTE — PROGRESS NOTE ADULT - ASSESSMENT
54 yo F with no significant PMH presented with SOB x 5 days, a/w loss of sense of smell and taste, nausea, and diarrhea. Patient has had diarrhea for about 2 days with 3 episodes of watery diarrhea today. Patient endorses nonproductive cough as well. She states she has been outside. Denies sick contacts but her  recently came down with a fever yesterday. Patient states she had the COVID antibody test done recently and was negative. Per ED documentation, pt went to PCP yesterday and was prescribed a Z-pack. Per EMS she was hypoxic to 80s on RA, improved to 93% on 6L NC.     On arrival to ED, patient was still satting >90s on NC but noted to be tachypneic, with increased WOB. She was placed on BIPAP. (03 Oct 2020 22:16)    HOSP COURSE:    Pt on remdesivir, dexamethasone and prophylactic lovenox.  Clinically improving, pt weaned off of bipap, and satting well on room air (as of 10/12).  Cxr clear.    ID Consult called for further recommendations.       Covid-19 with acute hypoxic resp failure:    - Pt with initial hypoxia to 80s on RA, and tachypneic at presentation, requiring Bipap for increased WOB.  Pt seen by [facility], deemed not a candidate.  Clinical findings likely secondary to Covid-19 pna.  Cov pcr and Abs (+).      - Pt completed extended course of remedesivir as per primary team.  Also on dexamethasone IV.  Complete 10 day course and observe off.  DD is elevated, pt remains on prophylactic lovenox.  Satting adequately on NC.  Wean off O2 as tolerated.     - No evidence for superimposed bacterial pna on cxr.  No productive cough or fever.  No indication for abx at this time.     CRP: 10 <-- 263  DD:  233<-- 154 <--  less than 150  Ferritin: 165 <-- 328  PCT:  0.04 <-- 0.15 <-- 0.68    - Pt still with desaturation while ambulating.  Consider outpt home O2.       Asymptomatic bacteruria:    - UA (neg), Ucx < 50K E.coli, likely contaminant.  Pt w/o urinary symptoms.  No indication for abx.       * Cont to wean off O2.  Pt completed remdesivir and dexamethasone.  d/c planning, awaiting pt to be weaned off supplemental O2, may need home O2 if continues to desaturate on ambulation.       Luz [facility]  [phone]

## 2020-10-16 NOTE — PROGRESS NOTE ADULT - ASSESSMENT
56 yo F without PMH presents to ED with 5 days of fever, cough, shortness of breath found hypoxic in ED requiring bipap and with +COVID-19    Problem/Plan - 1:  ·  Problem: Acute hypoxemic respiratory failure.  Plan: Likely 2/2 COVID, currently on HFNC   - finished remdesivir    Problem/Plan - 2:  ·  Problem: 2019 novel coronavirus disease (COVID-19).  Plan: improved  symptomatic management   wean off oxygen    dc planning

## 2020-10-16 NOTE — PROGRESS NOTE ADULT - SUBJECTIVE AND OBJECTIVE BOX
Patient is a 55y old  Female who presents with a chief complaint of SOB (15 Oct 2020 17:58)      Any change in ROS: Doing pretty good:  no SOB : off oxygen since yesterday     MEDICATIONS  (STANDING):  ALBUTerol    90 MICROgram(s) HFA Inhaler 1 Puff(s) Inhalation every 4 hours  bisacodyl Suppository 10 milliGRAM(s) Rectal once  dextrose 5%. 1000 milliLiter(s) (50 mL/Hr) IV Continuous <Continuous>  dextrose 50% Injectable 12.5 Gram(s) IV Push once  dextrose 50% Injectable 25 Gram(s) IV Push once  dextrose 50% Injectable 25 Gram(s) IV Push once  enoxaparin Injectable 40 milliGRAM(s) SubCutaneous daily  insulin lispro (HumaLOG) corrective regimen sliding scale   SubCutaneous three times a day before meals  insulin lispro (HumaLOG) corrective regimen sliding scale   SubCutaneous at bedtime  lactulose Syrup 10 Gram(s) Oral two times a day  polyethylene glycol 3350 17 Gram(s) Oral daily  senna 1 Tablet(s) Oral daily    MEDICATIONS  (PRN):  acetaminophen   Tablet .. 650 milliGRAM(s) Oral every 6 hours PRN Temp greater or equal to 38C (100.4F), Moderate Pain (4 - 6)  dextrose 40% Gel 15 Gram(s) Oral once PRN Blood Glucose LESS THAN 70 milliGRAM(s)/deciliter  glucagon  Injectable 1 milliGRAM(s) IntraMuscular once PRN Glucose LESS THAN 70 milligrams/deciliter    Vital Signs Last 24 Hrs  T(C): 36.5 (16 Oct 2020 10:23), Max: 36.8 (15 Oct 2020 13:37)  T(F): 97.7 (16 Oct 2020 10:23), Max: 98.3 (15 Oct 2020 13:37)  HR: 110 (16 Oct 2020 10:23) (61 - 110)  BP: 108/69 (16 Oct 2020 10:23) (80/48 - 108/69)  BP(mean): --  RR: 19 (16 Oct 2020 10:23) (17 - 19)  SpO2: 90% (16 Oct 2020 10:23) (85% - 97%)    I&O's Summary    15 Oct 2020 07:01  -  16 Oct 2020 07:00  --------------------------------------------------------  IN: 740 mL / OUT: 1 mL / NET: 739 mL          Physical Exam:   GENERAL: NAD, well-groomed, well-developed  HEENT: COLLIN/   Atraumatic, Normocephalic  ENMT: No tonsillar erythema, exudates, or enlargement; Moist mucous membranes, Good dentition, No lesions  NECK: Supple, No JVD, Normal thyroid  CHEST/LUNG: Clear to auscultaion, ; No rales, rhonchi, wheezing, or rubs  CVS: Regular rate and rhythm; No murmurs, rubs, or gallops  GI: : Soft, Nontender, Nondistended; Bowel sounds present  NERVOUS SYSTEM:  Alert & Oriented X3  EXTREMITIES:  2+ Peripheral Pulses, No clubbing, cyanosis, or edema  LYMPH: No lymphadenopathy noted  SKIN: No rashes or lesions  ENDOCRINOLOGY: No Thyromegaly  PSYCH: Appropriate    Labs:                              13.5   9.31  )-----------( 505      ( 16 Oct 2020 05:44 )             41.0                         14.0   12.23 )-----------( 628      ( 13 Oct 2020 07:01 )             44.5     10-16    137  |  100  |  16  ----------------------------<  78  4.2   |  25  |  0.56  10-13    134<L>  |  101  |  20  ----------------------------<  93  4.4   |  26  |  0.59    Ca    8.6      16 Oct 2020 05:44    TPro  6.3  /  Alb  3.1<L>  /  TBili  0.3  /  DBili  < 0.2  /  AST  13  /  ALT  8   /  AlkPhos  98  10-13    CAPILLARY BLOOD GLUCOSE      POCT Blood Glucose.: 78 mg/dL (15 Oct 2020 21:58)  POCT Blood Glucose.: 77 mg/dL (15 Oct 2020 11:44)            D-Dimer Assay, Quantitative: 233 ng/mL (10-13 @ 07:01)  Procalcitonin, Serum: 0.04 ng/mL (10-13 @ 07:01)        RECENT CULTURES:  r< from: Xray Chest 1 View- PORTABLE-Urgent (10.03.20 @ 16:05) >    EXAM:  XR CHEST PORTABLE URGENT 1V        PROCEDURE DATE:  Oct  3 2020         INTERPRETATION:  CLINICAL INFORMATION: Shortness of breath, cough, fever.    EXAM: Frontal radiograph of the chest.    COMPARISON: Chest radiograph from    FINDINGS:  Limited inspiration.  Right base linear opacity, left base patchy opacity.  There is no pleural effusion or pneumothorax.  The heart size is not well evaluated on this projection.  The visualized osseous structures demonstrate no acute pathology.    IMPRESSION:  Limited inspiration, right lower lung linear atelectasis, left lower lung atelectasis vs pneumonia.            AIDEN FREEDMAN M.D., RADIOLOGY RESIDENT  This document has been electronically signed.  JUAN CARLOS KENT M.D., ATTENDING RADIOLOGIST  This document has been electronically signed. Oct  4 2020  8:02AM    < end of copied text >        RESPIRATORY CULTURES:          Studies  Chest X-RAY  CT SCAN Chest   Venous Dopplers: LE:   CT Abdomen  Others

## 2020-10-16 NOTE — PROGRESS NOTE ADULT - SUBJECTIVE AND OBJECTIVE BOX
Patient is a 55y old  Female who presents with a chief complaint of SOB (16 Oct 2020 16:55)      INTERVAL HPI/OVERNIGHT EVENTS:  T(C): 36.9 (10-16-20 @ 15:22), Max: 36.9 (10-16-20 @ 15:22)  HR: 79 (10-16-20 @ 15:22) (61 - 110)  BP: 97/65 (10-16-20 @ 15:22) (80/48 - 108/69)  RR: 17 (10-16-20 @ 15:22) (17 - 19)  SpO2: 91% (10-16-20 @ 15:54) (85% - 97%)  Wt(kg): --  I&O's Summary    15 Oct 2020 07:01  -  16 Oct 2020 07:00  --------------------------------------------------------  IN: 740 mL / OUT: 1 mL / NET: 739 mL        LABS:                        13.5   9.31  )-----------( 505      ( 16 Oct 2020 05:44 )             41.0     10-16    137  |  100  |  16  ----------------------------<  78  4.2   |  25  |  0.56    Ca    8.6      16 Oct 2020 05:44          CAPILLARY BLOOD GLUCOSE      POCT Blood Glucose.: 77 mg/dL (16 Oct 2020 11:55)  POCT Blood Glucose.: 78 mg/dL (15 Oct 2020 21:58)            MEDICATIONS  (STANDING):  ALBUTerol    90 MICROgram(s) HFA Inhaler 1 Puff(s) Inhalation every 4 hours  bisacodyl Suppository 10 milliGRAM(s) Rectal once  dextrose 5%. 1000 milliLiter(s) (50 mL/Hr) IV Continuous <Continuous>  dextrose 50% Injectable 12.5 Gram(s) IV Push once  dextrose 50% Injectable 25 Gram(s) IV Push once  dextrose 50% Injectable 25 Gram(s) IV Push once  enoxaparin Injectable 40 milliGRAM(s) SubCutaneous daily  insulin lispro (HumaLOG) corrective regimen sliding scale   SubCutaneous three times a day before meals  insulin lispro (HumaLOG) corrective regimen sliding scale   SubCutaneous at bedtime  lactulose Syrup 10 Gram(s) Oral two times a day  polyethylene glycol 3350 17 Gram(s) Oral daily  senna 1 Tablet(s) Oral daily  sodium chloride 0.9%. 1000 milliLiter(s) (75 mL/Hr) IV Continuous <Continuous>    MEDICATIONS  (PRN):  acetaminophen   Tablet .. 650 milliGRAM(s) Oral every 6 hours PRN Temp greater or equal to 38C (100.4F), Moderate Pain (4 - 6)  dextrose 40% Gel 15 Gram(s) Oral once PRN Blood Glucose LESS THAN 70 milliGRAM(s)/deciliter  glucagon  Injectable 1 milliGRAM(s) IntraMuscular once PRN Glucose LESS THAN 70 milligrams/deciliter          PHYSICAL EXAM:  GENERAL: NAD, well-groomed, well-developed  HEAD:  Atraumatic, Normocephalic  CHEST/LUNG: Clear to percussion bilaterally; No rales, rhonchi, wheezing, or rubs  HEART: Regular rate and rhythm; No murmurs, rubs, or gallops  ABDOMEN: Soft, Nontender, Nondistended; Bowel sounds present  EXTREMITIES:  2+ Peripheral Pulses, No clubbing, cyanosis, or edema  LYMPH: No lymphadenopathy noted  SKIN: No rashes or lesions    Care Discussed with Consultants/Other Providers [ ] YES  [ ] NO

## 2020-10-17 VITALS — OXYGEN SATURATION: 94 % | RESPIRATION RATE: 17 BRPM

## 2020-10-17 LAB
ANION GAP SERPL CALC-SCNC: 10 MMO/L — SIGNIFICANT CHANGE UP (ref 7–14)
BUN SERPL-MCNC: 12 MG/DL — SIGNIFICANT CHANGE UP (ref 7–23)
CALCIUM SERPL-MCNC: 7.9 MG/DL — LOW (ref 8.4–10.5)
CHLORIDE SERPL-SCNC: 107 MMOL/L — SIGNIFICANT CHANGE UP (ref 98–107)
CO2 SERPL-SCNC: 21 MMOL/L — LOW (ref 22–31)
CREAT SERPL-MCNC: 0.5 MG/DL — SIGNIFICANT CHANGE UP (ref 0.5–1.3)
GLUCOSE BLDC GLUCOMTR-MCNC: 90 MG/DL — SIGNIFICANT CHANGE UP (ref 70–99)
GLUCOSE SERPL-MCNC: 82 MG/DL — SIGNIFICANT CHANGE UP (ref 70–99)
HCT VFR BLD CALC: 41 % — SIGNIFICANT CHANGE UP (ref 34.5–45)
HGB BLD-MCNC: 13 G/DL — SIGNIFICANT CHANGE UP (ref 11.5–15.5)
MCHC RBC-ENTMCNC: 28.6 PG — SIGNIFICANT CHANGE UP (ref 27–34)
MCHC RBC-ENTMCNC: 31.7 % — LOW (ref 32–36)
MCV RBC AUTO: 90.1 FL — SIGNIFICANT CHANGE UP (ref 80–100)
NRBC # FLD: 0 K/UL — SIGNIFICANT CHANGE UP (ref 0–0)
PLATELET # BLD AUTO: 402 K/UL — HIGH (ref 150–400)
PMV BLD: 8.6 FL — SIGNIFICANT CHANGE UP (ref 7–13)
POTASSIUM SERPL-MCNC: 4.1 MMOL/L — SIGNIFICANT CHANGE UP (ref 3.5–5.3)
POTASSIUM SERPL-SCNC: 4.1 MMOL/L — SIGNIFICANT CHANGE UP (ref 3.5–5.3)
RBC # BLD: 4.55 M/UL — SIGNIFICANT CHANGE UP (ref 3.8–5.2)
RBC # FLD: 13.7 % — SIGNIFICANT CHANGE UP (ref 10.3–14.5)
SODIUM SERPL-SCNC: 138 MMOL/L — SIGNIFICANT CHANGE UP (ref 135–145)
WBC # BLD: 9.04 K/UL — SIGNIFICANT CHANGE UP (ref 3.8–10.5)
WBC # FLD AUTO: 9.04 K/UL — SIGNIFICANT CHANGE UP (ref 3.8–10.5)

## 2020-10-17 RX ORDER — ALBUTEROL 90 UG/1
1 AEROSOL, METERED ORAL
Qty: 1 | Refills: 0
Start: 2020-10-17

## 2020-10-17 RX ADMIN — ALBUTEROL 1 PUFF(S): 90 AEROSOL, METERED ORAL at 01:45

## 2020-10-17 RX ADMIN — ALBUTEROL 1 PUFF(S): 90 AEROSOL, METERED ORAL at 05:13

## 2020-10-17 NOTE — DISCHARGE NOTE NURSING/CASE MANAGEMENT/SOCIAL WORK - PATIENT PORTAL LINK FT
You can access the FollowMyHealth Patient Portal offered by [facility] by registering at the following website: [email or web address]. By joining [employer]’s FollowMyHealth portal, you will also be able to view your health information using other applications (apps) compatible with our system.

## 2020-10-17 NOTE — DISCHARGE NOTE NURSING/CASE MANAGEMENT/SOCIAL WORK - NSDCFUADDAPPT_GEN_ALL_CORE_FT
If you are in need of a general medicine physician and post-discharge medical follow-up for further care/recommendations you may contact the [facility] for an appointment [phone]

## 2020-10-17 NOTE — PROVIDER CONTACT NOTE (OTHER) - ASSESSMENT
No complaints of any shortness of breath, chest pain, dizziness or headache.
No complaints of any shortness of breath, chest pain, dizziness or headache. No acute distress noted.
Pt is stable , not complaining of any respiratory distress. Pt 02 sat 88. Pt was proned and chest pt performed. Albuterol was given
patient on bipap not tolerating O2 80/82 percent patient stating cant eat upset she is not on high flow can even speak her family with the bipap very upset
Patient A&Ox4. on high flow nasal cannula FiO2 80, L/min 40. Patient tolerating well. Patient denies shortness of breath, denies chest pain. No retractions or use of accessory muscles noted.

## 2022-04-22 NOTE — PROVIDER CONTACT NOTE (OTHER) - RECOMMENDATIONS
well developed, well nourished , in no acute distress , ambulating without difficulty , normal communication ability Follow provider directions if patient desats into the 80's again. Follow provider directions if patient desats into the 80's again, currently stable.

## 2022-06-09 NOTE — ED PROVIDER NOTE - EKG #1 DATE/TIME
Opioid Pregnancy And Lactation Text: These medications can lead to premature delivery and should be avoided during pregnancy. These medications are also present in breast milk in small amounts. 03-Oct-2020 15:51

## 2024-11-11 NOTE — PROGRESS NOTE ADULT - PROBLEM/PLAN-4
DISPLAY PLAN FREE TEXT
right normal/left normal